# Patient Record
Sex: FEMALE | Race: WHITE | NOT HISPANIC OR LATINO | Employment: FULL TIME | URBAN - METROPOLITAN AREA
[De-identification: names, ages, dates, MRNs, and addresses within clinical notes are randomized per-mention and may not be internally consistent; named-entity substitution may affect disease eponyms.]

---

## 2019-12-26 PROBLEM — K21.9 GERD (GASTROESOPHAGEAL REFLUX DISEASE): Status: ACTIVE | Noted: 2019-12-26

## 2019-12-26 PROBLEM — G43.909 MIGRAINES: Status: ACTIVE | Noted: 2019-12-26

## 2019-12-26 PROBLEM — E78.5 HYPERLIPIDEMIA: Status: ACTIVE | Noted: 2019-12-26

## 2019-12-26 PROBLEM — I10 HYPERTENSION: Status: ACTIVE | Noted: 2019-12-26

## 2019-12-26 PROBLEM — K58.9 IBS (IRRITABLE BOWEL SYNDROME): Status: ACTIVE | Noted: 2019-12-26

## 2019-12-30 ENCOUNTER — OFFICE VISIT (OUTPATIENT)
Dept: FAMILY MEDICINE CLINIC | Facility: CLINIC | Age: 39
End: 2019-12-30

## 2019-12-30 VITALS
HEART RATE: 79 BPM | BODY MASS INDEX: 25.03 KG/M2 | TEMPERATURE: 97.7 F | WEIGHT: 136 LBS | OXYGEN SATURATION: 100 % | SYSTOLIC BLOOD PRESSURE: 129 MMHG | HEIGHT: 62 IN | DIASTOLIC BLOOD PRESSURE: 85 MMHG

## 2019-12-30 DIAGNOSIS — Z23 FLU VACCINE NEED: ICD-10-CM

## 2019-12-30 DIAGNOSIS — G43.009 MIGRAINE WITHOUT AURA AND WITHOUT STATUS MIGRAINOSUS, NOT INTRACTABLE: Primary | ICD-10-CM

## 2019-12-30 DIAGNOSIS — Z80.3 FAMILY HISTORY OF BREAST CANCER IN MOTHER: ICD-10-CM

## 2019-12-30 DIAGNOSIS — Z12.31 ENCOUNTER FOR SCREENING MAMMOGRAM FOR BREAST CANCER: ICD-10-CM

## 2019-12-30 PROBLEM — I10 HYPERTENSION: Status: RESOLVED | Noted: 2019-12-26 | Resolved: 2019-12-30

## 2019-12-30 PROCEDURE — 90471 IMMUNIZATION ADMIN: CPT | Performed by: FAMILY MEDICINE

## 2019-12-30 PROCEDURE — 99204 OFFICE O/P NEW MOD 45 MIN: CPT | Performed by: FAMILY MEDICINE

## 2019-12-30 PROCEDURE — 90674 CCIIV4 VAC NO PRSV 0.5 ML IM: CPT | Performed by: FAMILY MEDICINE

## 2019-12-30 RX ORDER — SUMATRIPTAN 100 MG/1
100 TABLET, FILM COATED ORAL DAILY
Qty: 9 TABLET | Refills: 12 | Status: SHIPPED | OUTPATIENT
Start: 2019-12-30 | End: 2021-01-13

## 2019-12-30 RX ORDER — SUMATRIPTAN 100 MG/1
1 TABLET, FILM COATED ORAL DAILY
COMMUNITY
Start: 2019-10-21 | End: 2019-12-30 | Stop reason: SDUPTHER

## 2020-01-21 DIAGNOSIS — Z80.3 FAMILY HISTORY OF BREAST CANCER IN MOTHER: ICD-10-CM

## 2021-01-13 DIAGNOSIS — G43.009 MIGRAINE WITHOUT AURA AND WITHOUT STATUS MIGRAINOSUS, NOT INTRACTABLE: ICD-10-CM

## 2021-01-13 RX ORDER — SUMATRIPTAN 100 MG/1
100 TABLET, FILM COATED ORAL DAILY
Qty: 9 TABLET | Refills: 0 | Status: SHIPPED | OUTPATIENT
Start: 2021-01-13 | End: 2021-02-17 | Stop reason: SDUPTHER

## 2021-01-13 NOTE — TELEPHONE ENCOUNTER
Please let patient know I am renewing Imitrex however has been over 1 year since last appointment, would recommend she makes an appointment for a recheck.

## 2021-02-17 ENCOUNTER — OFFICE VISIT (OUTPATIENT)
Dept: FAMILY MEDICINE CLINIC | Facility: CLINIC | Age: 41
End: 2021-02-17

## 2021-02-17 VITALS
HEIGHT: 63 IN | OXYGEN SATURATION: 99 % | SYSTOLIC BLOOD PRESSURE: 136 MMHG | RESPIRATION RATE: 16 BRPM | WEIGHT: 157.8 LBS | DIASTOLIC BLOOD PRESSURE: 90 MMHG | HEART RATE: 88 BPM | BODY MASS INDEX: 27.96 KG/M2 | TEMPERATURE: 97.1 F

## 2021-02-17 DIAGNOSIS — G89.29 CHRONIC MIDLINE THORACIC BACK PAIN: ICD-10-CM

## 2021-02-17 DIAGNOSIS — Z12.31 ENCOUNTER FOR SCREENING MAMMOGRAM FOR BREAST CANCER: ICD-10-CM

## 2021-02-17 DIAGNOSIS — Z13.1 SCREENING FOR DIABETES MELLITUS: ICD-10-CM

## 2021-02-17 DIAGNOSIS — G43.009 MIGRAINE WITHOUT AURA AND WITHOUT STATUS MIGRAINOSUS, NOT INTRACTABLE: ICD-10-CM

## 2021-02-17 DIAGNOSIS — Z11.59 NEED FOR HEPATITIS C SCREENING TEST: ICD-10-CM

## 2021-02-17 DIAGNOSIS — Z00.00 ANNUAL PHYSICAL EXAM: Primary | ICD-10-CM

## 2021-02-17 DIAGNOSIS — M54.50 LUMBAR BACK PAIN: ICD-10-CM

## 2021-02-17 DIAGNOSIS — Z13.220 SCREENING FOR CHOLESTEROL LEVEL: ICD-10-CM

## 2021-02-17 DIAGNOSIS — Z11.59 ENCOUNTER FOR HEPATITIS C SCREENING TEST FOR LOW RISK PATIENT: ICD-10-CM

## 2021-02-17 DIAGNOSIS — M54.6 CHRONIC MIDLINE THORACIC BACK PAIN: ICD-10-CM

## 2021-02-17 PROCEDURE — 99213 OFFICE O/P EST LOW 20 MIN: CPT | Performed by: FAMILY MEDICINE

## 2021-02-17 PROCEDURE — 99396 PREV VISIT EST AGE 40-64: CPT | Performed by: FAMILY MEDICINE

## 2021-02-17 RX ORDER — SUMATRIPTAN 100 MG/1
100 TABLET, FILM COATED ORAL DAILY
Qty: 9 TABLET | Refills: 12 | Status: SHIPPED | OUTPATIENT
Start: 2021-02-17 | End: 2022-03-07 | Stop reason: SDUPTHER

## 2021-02-17 NOTE — PROGRESS NOTES
"Chief Complaint  Annual Exam, Headache, and Back Pain    Subjective          History of Present Illness  Bren Rivas presents to [facility] for annual exam.  She has not been seen since December 2019. She is still having issues with her migraines. She takes the imitrex as prescribe. Patient is also having back pain in her lower back that radiates up the back to her shoulders. Started at least 8 months ago, seems to be getting worse. Stopped walking for exercise due to the pain, worried the severe pain could cause a fall. Icy hot, absorb, stretching has not made much difference.  Pain level average of 6.  Shooting pain up to a 10 however very brief.     Mammogram 1/20/20 at [facility] BI-RADS 1 Negative  Mother has history of breast cancer  Last Pap was October 2018, she gets every 3 years with gynecologist, Sharifa Almonte    No current outpatient medications on file prior to visit.     No current facility-administered medications on file prior to visit.        Objective   Vital Signs:   /90 (BP Location: Left arm, Patient Position: Sitting, Cuff Size: Adult)   Pulse 88   Temp 97.1 °F (36.2 °C) (Infrared)   Resp 16   Ht 160 cm (63\")   Wt 71.6 kg (157 lb 12.8 oz)   SpO2 99%   BMI 27.95 kg/m²     Physical Exam  Vitals signs and nursing note reviewed.   Constitutional:       General: She is not in acute distress.     Appearance: Normal appearance. She is well-developed.   HENT:      Head: Normocephalic and atraumatic.   Eyes:      Conjunctiva/sclera: Conjunctivae normal.      Pupils: Pupils are equal, round, and reactive to light.   Neck:      Musculoskeletal: Normal range of motion.      Thyroid: No thyromegaly.      Vascular: No JVD.   Cardiovascular:      Rate and Rhythm: Normal rate and regular rhythm.      Heart sounds: Normal heart sounds. No murmur.   Pulmonary:      Breath sounds: Normal breath sounds. No wheezing or rales.   Musculoskeletal: Normal range of motion.  "        General: No swelling, tenderness, deformity or signs of injury.      Right lower leg: No edema.      Left lower leg: No edema.      Comments: No significant tenderness palpation of thoracic or lumbosacral spine or paraspinous muscles.  There is normal range of motion of back and extremities.   Lymphadenopathy:      Cervical: No cervical adenopathy.   Skin:     General: Skin is warm and dry.      Findings: No rash.   Neurological:      Mental Status: She is alert and oriented to person, place, and time.   Psychiatric:         Mood and Affect: Mood normal.         Behavior: Behavior normal.          Lab Results   Component Value Date     (H) 02/17/2021    BUN 13 02/17/2021    CREATININE 0.56 (L) 02/17/2021    EGFRIFNONA 116 02/17/2021    EGFRIFAFRI 134 02/17/2021     02/17/2021    K 3.8 02/17/2021     02/17/2021    CALCIUM 10.3 (H) 02/17/2021    ALBUMIN 4.2 02/17/2021    BILITOT 0.3 02/17/2021    ALKPHOS 60 02/17/2021    AST 30 02/17/2021    ALT 39 (H) 02/17/2021    CHLPL 226 (H) 02/17/2021    TRIG 259 (H) 02/17/2021    HDL 41 02/17/2021    VLDL 47 (H) 02/17/2021     (H) 02/17/2021        No results found for: HGBA1C    Result Review :                       Assessment and Plan    Diagnoses and all orders for this visit:    1. Annual physical exam (Primary)  -     Mammo Screening Digital Tomosynthesis Bilateral With CAD; Future    2. Encounter for screening mammogram for breast cancer  -     Mammo Screening Digital Tomosynthesis Bilateral With CAD; Future    3. Need for hepatitis C screening test    4. Screening for diabetes mellitus  -     Comprehensive Metabolic Panel    5. Encounter for hepatitis C screening test for low risk patient  -     Hepatitis C Antibody    6. Screening for cholesterol level  -     Lipid Panel    7. Chronic midline thoracic back pain  -     XR Spine Thoracic 2 View    8. Lumbar back pain  -     XR Spine Lumbar 2 or 3 View    9. Migraine without aura and  without status migrainosus, not intractable  -     SUMAtriptan (IMITREX) 100 MG tablet; Take 1 tablet by mouth Daily. May repeat in 2 hours if need, max 2 in 24 hours  Dispense: 9 tablet; Refill: 12    Annual, pending health care, ordering mammogram.  She is to schedule Pap with gynecologist in October.  Screening for hepatitis C high cholesterol and diabetes.  Chronic thoracic and lumbar back pain x-rays as ordered  Migraine headaches stable, renewing Imitrex for as needed use.    Medications Discontinued During This Encounter   Medication Reason   • SUMAtriptan (IMITREX) 100 MG tablet Reorder         Follow Up     Return in about 1 year (around 2/17/2022) for Annual physical or as needed back pain, migraines, or other concerns..    Patient was given instructions and counseling regarding her condition or for health maintenance advice. Please see specific information pulled into the AVS if appropriate.

## 2021-02-18 DIAGNOSIS — M54.6 CHRONIC MIDLINE THORACIC BACK PAIN: Primary | ICD-10-CM

## 2021-02-18 DIAGNOSIS — M54.50 LUMBAR BACK PAIN: ICD-10-CM

## 2021-02-18 DIAGNOSIS — G89.29 CHRONIC MIDLINE THORACIC BACK PAIN: Primary | ICD-10-CM

## 2021-02-18 LAB
ALBUMIN SERPL-MCNC: 4.2 G/DL (ref 3.8–4.8)
ALBUMIN/GLOB SERPL: 1.6 {RATIO} (ref 1.2–2.2)
ALP SERPL-CCNC: 60 IU/L (ref 39–117)
ALT SERPL-CCNC: 39 IU/L (ref 0–32)
AST SERPL-CCNC: 30 IU/L (ref 0–40)
BILIRUB SERPL-MCNC: 0.3 MG/DL (ref 0–1.2)
BUN SERPL-MCNC: 13 MG/DL (ref 6–24)
BUN/CREAT SERPL: 23 (ref 9–23)
CALCIUM SERPL-MCNC: 10.3 MG/DL (ref 8.7–10.2)
CHLORIDE SERPL-SCNC: 100 MMOL/L (ref 96–106)
CHOLEST SERPL-MCNC: 226 MG/DL (ref 100–199)
CO2 SERPL-SCNC: 24 MMOL/L (ref 20–29)
CREAT SERPL-MCNC: 0.56 MG/DL (ref 0.57–1)
GLOBULIN SER CALC-MCNC: 2.6 G/DL (ref 1.5–4.5)
GLUCOSE SERPL-MCNC: 104 MG/DL (ref 65–99)
HCV AB S/CO SERPL IA: <0.1 S/CO RATIO (ref 0–0.9)
HDLC SERPL-MCNC: 41 MG/DL
LDLC SERPL CALC-MCNC: 138 MG/DL (ref 0–99)
POTASSIUM SERPL-SCNC: 3.8 MMOL/L (ref 3.5–5.2)
PROT SERPL-MCNC: 6.8 G/DL (ref 6–8.5)
SODIUM SERPL-SCNC: 140 MMOL/L (ref 134–144)
TRIGL SERPL-MCNC: 259 MG/DL (ref 0–149)
VLDLC SERPL CALC-MCNC: 47 MG/DL (ref 5–40)

## 2021-02-18 RX ORDER — MELOXICAM 15 MG/1
15 TABLET ORAL DAILY
Qty: 30 TABLET | Refills: 2 | Status: SHIPPED | OUTPATIENT
Start: 2021-02-18 | End: 2021-05-24

## 2021-03-05 DIAGNOSIS — Z00.00 ANNUAL PHYSICAL EXAM: ICD-10-CM

## 2021-03-05 DIAGNOSIS — Z12.31 ENCOUNTER FOR SCREENING MAMMOGRAM FOR BREAST CANCER: ICD-10-CM

## 2021-04-28 ENCOUNTER — TELEPHONE (OUTPATIENT)
Dept: FAMILY MEDICINE CLINIC | Facility: CLINIC | Age: 41
End: 2021-04-28

## 2021-04-28 DIAGNOSIS — T75.3XXA MOTION SICKNESS, INITIAL ENCOUNTER: Primary | ICD-10-CM

## 2021-04-28 RX ORDER — SCOLOPAMINE TRANSDERMAL SYSTEM 1 MG/1
1 PATCH, EXTENDED RELEASE TRANSDERMAL
Qty: 4 PATCH | Refills: 1 | Status: SHIPPED | OUTPATIENT
Start: 2021-04-28

## 2021-04-28 NOTE — TELEPHONE ENCOUNTER
Caller: Bren Rivas    Relationship: Self    Best call back number: [phone]    What medication are you requesting: MOTION SICKNESS PATCHES         If a prescription is needed, what is your preferred pharmacy and phone number: [id number] - [place], [place] - [address] . - [phone] [facility] [phone] FX     Additional notes: PATIENT STATES THAT SHE AND HER SON BOTH GET VERY CAR SICK. THEY ARE GETTING READY TO LEAVE ON VACATION AND REQUEST MOTION SICKNESS PATCHES.

## 2021-05-22 DIAGNOSIS — M54.6 CHRONIC MIDLINE THORACIC BACK PAIN: ICD-10-CM

## 2021-05-22 DIAGNOSIS — M54.50 LUMBAR BACK PAIN: ICD-10-CM

## 2021-05-22 DIAGNOSIS — G89.29 CHRONIC MIDLINE THORACIC BACK PAIN: ICD-10-CM

## 2021-05-24 RX ORDER — MELOXICAM 15 MG/1
TABLET ORAL
Qty: 30 TABLET | Refills: 5 | Status: SHIPPED | OUTPATIENT
Start: 2021-05-24 | End: 2021-11-22

## 2021-11-20 DIAGNOSIS — M54.6 CHRONIC MIDLINE THORACIC BACK PAIN: ICD-10-CM

## 2021-11-20 DIAGNOSIS — M54.50 LUMBAR BACK PAIN: ICD-10-CM

## 2021-11-20 DIAGNOSIS — G89.29 CHRONIC MIDLINE THORACIC BACK PAIN: ICD-10-CM

## 2021-11-22 RX ORDER — MELOXICAM 15 MG/1
15 TABLET ORAL DAILY
Qty: 30 TABLET | Refills: 2 | Status: SHIPPED | OUTPATIENT
Start: 2021-11-22 | End: 2022-03-07 | Stop reason: SDUPTHER

## 2022-03-03 ENCOUNTER — TELEPHONE (OUTPATIENT)
Dept: FAMILY MEDICINE CLINIC | Facility: CLINIC | Age: 42
End: 2022-03-03

## 2022-03-03 DIAGNOSIS — Z12.31 ENCOUNTER FOR SCREENING MAMMOGRAM FOR BREAST CANCER: Primary | ICD-10-CM

## 2022-03-03 NOTE — TELEPHONE ENCOUNTER
Caller: Bren Rivas    Relationship: Self    Best call back number: [phone]    What orders are you requesting (i.e. lab or imaging): MAMMOGRAM     In what timeframe would the patient need to come in:     Where will you receive your lab/imaging services:     Additional notes: [facility] MOJ[facility] PHONE [phone]  EXT 1

## 2022-03-04 NOTE — TELEPHONE ENCOUNTER
Please place order for screening mammogram as requested to be done at [facility] women's [facility]

## 2022-03-05 DIAGNOSIS — M54.6 CHRONIC MIDLINE THORACIC BACK PAIN: ICD-10-CM

## 2022-03-05 DIAGNOSIS — G89.29 CHRONIC MIDLINE THORACIC BACK PAIN: ICD-10-CM

## 2022-03-05 DIAGNOSIS — M54.50 LUMBAR BACK PAIN: ICD-10-CM

## 2022-03-07 ENCOUNTER — OFFICE VISIT (OUTPATIENT)
Dept: FAMILY MEDICINE CLINIC | Facility: CLINIC | Age: 42
End: 2022-03-07

## 2022-03-07 VITALS
OXYGEN SATURATION: 98 % | TEMPERATURE: 98 F | DIASTOLIC BLOOD PRESSURE: 68 MMHG | SYSTOLIC BLOOD PRESSURE: 127 MMHG | RESPIRATION RATE: 18 BRPM | WEIGHT: 154 LBS | BODY MASS INDEX: 27.29 KG/M2 | HEART RATE: 86 BPM | HEIGHT: 63 IN

## 2022-03-07 DIAGNOSIS — M54.6 CHRONIC MIDLINE THORACIC BACK PAIN: ICD-10-CM

## 2022-03-07 DIAGNOSIS — G89.29 CHRONIC MIDLINE THORACIC BACK PAIN: ICD-10-CM

## 2022-03-07 DIAGNOSIS — M54.50 LUMBAR BACK PAIN: ICD-10-CM

## 2022-03-07 DIAGNOSIS — G43.009 MIGRAINE WITHOUT AURA AND WITHOUT STATUS MIGRAINOSUS, NOT INTRACTABLE: ICD-10-CM

## 2022-03-07 PROCEDURE — 99214 OFFICE O/P EST MOD 30 MIN: CPT | Performed by: FAMILY MEDICINE

## 2022-03-07 RX ORDER — SENNOSIDES 8.6 MG
CAPSULE ORAL
Qty: 90 TABLET | Refills: 3
Start: 2022-03-07

## 2022-03-07 RX ORDER — TOPIRAMATE 25 MG/1
TABLET ORAL
Qty: 360 TABLET | Refills: 0 | Status: SHIPPED | OUTPATIENT
Start: 2022-03-07 | End: 2022-04-12 | Stop reason: SINTOL

## 2022-03-07 RX ORDER — MELOXICAM 15 MG/1
15 TABLET ORAL DAILY
Qty: 90 TABLET | Refills: 3 | Status: SHIPPED | OUTPATIENT
Start: 2022-03-07

## 2022-03-07 RX ORDER — SUMATRIPTAN 100 MG/1
100 TABLET, FILM COATED ORAL DAILY
Qty: 27 TABLET | Refills: 3 | Status: SHIPPED | OUTPATIENT
Start: 2022-03-07 | End: 2022-11-28

## 2022-03-07 RX ORDER — MELOXICAM 15 MG/1
15 TABLET ORAL DAILY
Qty: 30 TABLET | Refills: 12 | OUTPATIENT
Start: 2022-03-07

## 2022-03-07 NOTE — PROGRESS NOTES
"Chief Complaint  Headache, Back Pain, Shoulder Pain, and Neck Pain     History of Present Illness  Bren Rivas presents today for a follow up on chronic medical concerns.  Last appointment was just over a year ago.     Migraines: about 2 a month but last for 3 days. Have dose typically taking 4 or 5 imitrex over the 3 days.  The patient states that she is still taking the SUMAtriptan (IMITREX) 100 MG tablet. The patient states that she will take one at the onset of a headache frequent do take a second and that does help relieve the pain.    Chronic back, neck and shoulder pain.  She is taking tylenol arthrtis and  meloxicam (MOBIC) 15 MG tablet daily and reports that she feels like she has a stiff neck and shoulders and does have a hard time sleeping at night due to the pain, but the medication does help some.     Believes had Pap with gynecologist, Sharifa Almonte October 2021    Current Outpatient Medications on File Prior to Visit   Medication Sig   • Scopolamine (Transderm-Scop, 1.5 MG,) 1.5 MG/3DAYS patch Place 1 patch on the skin as directed by provider Every 72 (Seventy-Two) Hours. Starting at least 4 hours prior to travel   • [DISCONTINUED] meloxicam (MOBIC) 15 MG tablet Take 1 tablet by mouth Daily.   • [DISCONTINUED] SUMAtriptan (IMITREX) 100 MG tablet Take 1 tablet by mouth Daily. May repeat in 2 hours if need, max 2 in 24 hours     No current facility-administered medications on file prior to visit.       Objective   Vital Signs:   /68   Pulse 86   Temp 98 °F (36.7 °C) (Temporal)   Resp 18   Ht 160 cm (63\")   Wt 69.9 kg (154 lb)   SpO2 98%   BMI 27.28 kg/m²       Physical Exam  Vitals and nursing note reviewed.   Constitutional:       General: She is not in acute distress.     Appearance: Normal appearance. She is well-developed. She is obese.   HENT:      Head: Normocephalic and atraumatic.   Eyes:      Conjunctiva/sclera: Conjunctivae normal.      Pupils: Pupils are equal, round, and " reactive to light.   Neck:      Thyroid: No thyromegaly.      Vascular: No JVD.   Cardiovascular:      Rate and Rhythm: Normal rate and regular rhythm.      Heart sounds: Normal heart sounds. No murmur heard.  Pulmonary:      Breath sounds: Normal breath sounds. No wheezing or rales.   Musculoskeletal:         General: No swelling, tenderness, deformity or signs of injury. Normal range of motion.      Cervical back: Normal range of motion.      Right lower leg: No edema.      Left lower leg: No edema.      Comments: No significant tenderness palpation of cervical, thoracic or lumbosacral spine or paraspinous muscles.  There is normal range of motion of neck, back, shoulders and arms   Lymphadenopathy:      Cervical: No cervical adenopathy.   Skin:     General: Skin is warm and dry.      Findings: No rash.   Neurological:      Mental Status: She is alert and oriented to person, place, and time.   Psychiatric:         Mood and Affect: Mood normal.         Behavior: Behavior normal.            No visits with results within 1 Day(s) from this visit.   Latest known visit with results is:   Office Visit on 02/17/2021   Component Date Value Ref Range Status   • Hep C Virus Ab 02/17/2021 <0.1  0.0 - 0.9 s/co ratio Final    Comment:                                   Negative:     < 0.8                               Indeterminate: 0.8 - 0.9                                    Positive:     > 0.9   The CDC recommends that a positive HCV antibody result   be followed up with a HCV Nucleic Acid Amplification   test (116624).     • Total Cholesterol 02/17/2021 226 (A) 100 - 199 mg/dL Final   • Triglycerides 02/17/2021 259 (A) 0 - 149 mg/dL Final   • HDL Cholesterol 02/17/2021 41  >39 mg/dL Final   • VLDL Cholesterol [place] 02/17/2021 47 (A) 5 - 40 mg/dL Final   • LDL Chol Calc ([facility]) 02/17/2021 138 (A) 0 - 99 mg/dL Final   • Glucose 02/17/2021 104 (A) 65 - 99 mg/dL Final   • BUN 02/17/2021 13  6 - 24 mg/dL Final   • Creatinine  02/17/2021 0.56 (A) 0.57 - 1.00 mg/dL Final   • eGFR Non  Am 02/17/2021 116  >59 mL/min/1.73 Final   • eGFR African Am 02/17/2021 134  >59 mL/min/1.73 Final   • BUN/Creatinine Ratio 02/17/2021 23  9 - 23 Final   • Sodium 02/17/2021 140  134 - 144 mmol/L Final   • Potassium 02/17/2021 3.8  3.5 - 5.2 mmol/L Final   • Chloride 02/17/2021 100  96 - 106 mmol/L Final   • Total CO2 02/17/2021 24  20 - 29 mmol/L Final   • Calcium 02/17/2021 10.3 (A) 8.7 - 10.2 mg/dL Final   • Total Protein 02/17/2021 6.8  6.0 - 8.5 g/dL Final   • Albumin 02/17/2021 4.2  3.8 - 4.8 g/dL Final   • Globulin 02/17/2021 2.6  1.5 - 4.5 g/dL Final   • A/G Ratio 02/17/2021 1.6  1.2 - 2.2 Final   • Total Bilirubin 02/17/2021 0.3  0.0 - 1.2 mg/dL Final   • Alkaline Phosphatase 02/17/2021 60  39 - 117 IU/L Final   • AST (SGOT) 02/17/2021 30  0 - 40 IU/L Final   • ALT (SGPT) 02/17/2021 39 (A) 0 - 32 IU/L Final             No results found for: HGBA1C        DATE OF EXAM:  2/17/2021 3:08 PM     PROCEDURE:  XR SPINE LUMBAR 2 OR 3 VW-     INDICATIONS:  Low back pain.     COMPARISON:  No Comparisons Available     TECHNIQUE:   Two to three radiologic views of the lumbosacral spine were obtained.     FINDINGS:  There is spurring of the vertebral endplates indicating spondylosis. The  disc spaces are well-maintained. There is an incidental posterior spinal  fusion defect at L5. The facet joints are intact. There is mild spurring  of the sacroiliac joints indicating arthritic changes. There is no acute  fracture or dislocation. The soft tissues are unremarkable.     IMPRESSION:  No acute fracture or dislocation.     Electronically Signed By-Jordon Gaston MD On:2/17/2021 3:25 PM  This report was finalized on [id number] by  Jordon Gaston MD.     Assessment and Plan {CC Problem List  Visit Diagnosis  ROS  Review (Popup)  Health Maintenance  Quality  BestPractice  Medications  SmartSets  SnapShot Encounters  Media :23}   Diagnoses and all orders  for this visit:    1. Migraine without aura and without status migrainosus, not intractable  -     SUMAtriptan (IMITREX) 100 MG tablet; Take 1 tablet by mouth Daily. May repeat in 2 hours if need, max 2 in 24 hours  Dispense: 27 tablet; Refill: 3  -     topiramate (Topamax) 25 MG tablet; 1 bid for 1 week increase to 2 bid if needed for migraine prevention  Dispense: 360 tablet; Refill: 0    2. Chronic midline thoracic back pain  -     meloxicam (MOBIC) 15 MG tablet; Take 1 tablet by mouth Daily.  Dispense: 90 tablet; Refill: 3  -     acetaminophen (TYLENOL) 650 MG 8 hr tablet; 1 q am for muscle back pain  Dispense: 90 tablet; Refill: 3    3. Lumbar back pain  -     meloxicam (MOBIC) 15 MG tablet; Take 1 tablet by mouth Daily.  Dispense: 90 tablet; Refill: 3  -     acetaminophen (TYLENOL) 650 MG 8 hr tablet; 1 q am for muscle back pain  Dispense: 90 tablet; Refill: 3      Migraine headaches, with at least 6 headache days a month.  She is using her entire monthly supply of Imitrex and sometimes this is not enough.  After discussion we are going to try Topamax as a preventative.  Starting at 25 mg twice daily increasing to 50 mg twice daily and could increase further if needed.  Advised there are other preventative treatments if this is not effective.    x-rays of lumbar and thoracic spine did show spurring of the vertebral  endplates indicating spondylosis and spurring at the SI joints indicating arthritic changes.  Reminded to do back stretching and strengthening exercises.  Patient declines referral to physical therapy, saying it did not help in the past.  She will continue meloxicam and Tylenol arthritis in the mornings, advised she can increase the Tylenol to 2 tablets 3 times daily if needed.  Could consider muscle relaxer but do not want to start it and Topamax at the same time.  Will consider when she returns.     Obesity, discussed weight reduction by reduced calorie diet and increase physical activity.   Additionally hopefully Topamax will not work as an appetite suppressant.    Medications Discontinued During This Encounter   Medication Reason   • SUMAtriptan (IMITREX) 100 MG tablet Reorder   • meloxicam (MOBIC) 15 MG tablet Reorder         Follow Up     Return in about 3 months (around 6/7/2022) for Annual physical recheck migraines and muscle stiffness.    Patient was given instructions and counseling regarding her condition or for health maintenance advice. Please see specific information pulled into the AVS if appropriate.

## 2022-04-12 ENCOUNTER — OFFICE VISIT (OUTPATIENT)
Dept: FAMILY MEDICINE CLINIC | Facility: CLINIC | Age: 42
End: 2022-04-12

## 2022-04-12 VITALS
BODY MASS INDEX: 27.32 KG/M2 | SYSTOLIC BLOOD PRESSURE: 126 MMHG | OXYGEN SATURATION: 100 % | TEMPERATURE: 98.2 F | HEIGHT: 63 IN | RESPIRATION RATE: 12 BRPM | HEART RATE: 104 BPM | DIASTOLIC BLOOD PRESSURE: 90 MMHG | WEIGHT: 154.2 LBS

## 2022-04-12 DIAGNOSIS — K21.9 GASTROESOPHAGEAL REFLUX DISEASE, UNSPECIFIED WHETHER ESOPHAGITIS PRESENT: ICD-10-CM

## 2022-04-12 DIAGNOSIS — M54.50 LUMBAR BACK PAIN: ICD-10-CM

## 2022-04-12 DIAGNOSIS — Z00.00 ANNUAL PHYSICAL EXAM: Primary | ICD-10-CM

## 2022-04-12 DIAGNOSIS — E66.3 OVERWEIGHT: ICD-10-CM

## 2022-04-12 DIAGNOSIS — G89.29 CHRONIC MIDLINE THORACIC BACK PAIN: ICD-10-CM

## 2022-04-12 DIAGNOSIS — M54.6 CHRONIC MIDLINE THORACIC BACK PAIN: ICD-10-CM

## 2022-04-12 DIAGNOSIS — G89.29 CHRONIC LEFT SHOULDER PAIN: ICD-10-CM

## 2022-04-12 DIAGNOSIS — M25.512 CHRONIC LEFT SHOULDER PAIN: ICD-10-CM

## 2022-04-12 DIAGNOSIS — G43.009 MIGRAINE WITHOUT AURA AND WITHOUT STATUS MIGRAINOSUS, NOT INTRACTABLE: ICD-10-CM

## 2022-04-12 PROCEDURE — 99396 PREV VISIT EST AGE 40-64: CPT | Performed by: FAMILY MEDICINE

## 2022-04-12 PROCEDURE — 99214 OFFICE O/P EST MOD 30 MIN: CPT | Performed by: FAMILY MEDICINE

## 2022-04-12 RX ORDER — CYCLOBENZAPRINE HCL 10 MG
10 TABLET ORAL 3 TIMES DAILY PRN
Qty: 30 TABLET | Refills: 1 | Status: SHIPPED | OUTPATIENT
Start: 2022-04-12

## 2022-04-12 NOTE — PROGRESS NOTES
Mikaela Rivas is a 41 y.o. female.     Chief Complaint   Patient presents with   • Annual Exam   • Medication Reaction     History of Present Illness   Pt reports her new medication, topamax, increased to 25 mg bid,  has been giving her some side effects with heartburn worsening on a daily basis, having needle numb like feeling in her fingertips and toes. Did not notice improvement in migraines still with about 6 a month. Imitrex does help.does cause some constipation but tollerable.    Meloxicam does help neck pain.   Heart burn is getting worse, tums not helping as much as used to, symptoms now daily.  Pt states when she lifts 20-30 lbs she will experience sharp, shooting like pains in her chest. Need meloxicam for low back pain  Is also scheduled to follow-up migraines.  Last appointment 5 weeks ago we started her on Topamax with titration up to max of 50 mg twice daily      The patient is here: for coordination of medical care to discuss health maintenance and disease prevention.  Last comprehensive physical was on 02/17/2021.  Previous physical was performed by  Dee Segovia MD  Overall has: moderate activity with work/home activities, exercises 2 - 3 times per week, good appetite, feels well with minor complaints, decreased energy level and is sleeping poorly. Nutrition: balanced diet and excessive weight gain. Last tetanus shot was unknown.   Patient is doing routine self breast exams: every 4 - 6 months   Last Completed Mammogram     This patient has no relevant Health Maintenance data.      March 4 2022 [facility] 1        Recent Hospitalizations:  No hospitalization(s) within the last year..  ccc      I personally reviewed and updated the patient's allergies, medications, problem list, and past medical, surgical, social, and family history. I have reviewed and confirmed the accuracy of the HPI and ROS as documented by the MA/LPN/RN Dee Segovia,  "MD    Family History   Problem Relation Age of Onset   • Cancer Mother         breast   • Diabetes Father    • Hypertension Father    • Heart disease Father    • Parkinsonism Maternal Grandmother    • Parkinsonism Paternal Grandmother    • Diabetes Paternal Grandfather    • Cancer Son         breast        Social History     Tobacco Use   • Smoking status: Never Smoker   • Smokeless tobacco: Never Used   Vaping Use   • Vaping Use: Never used   Substance Use Topics   • Alcohol use: Not Currently   • Drug use: Never       Past Surgical History:   Procedure Laterality Date   • DENTAL PROCEDURE      implant   • TUBAL ABDOMINAL LIGATION         Patient Active Problem List   Diagnosis   • Hyperlipidemia   • Migraines   • GERD (gastroesophageal reflux disease)   • IBS (irritable bowel syndrome)   • Family history of breast cancer in mother   • Overweight   • Chronic left shoulder pain   • Lumbar back pain   • Chronic midline thoracic back pain         Current Outpatient Medications:   •  acetaminophen (TYLENOL) 650 MG 8 hr tablet, 1 q am for muscle back pain, Disp: 90 tablet, Rfl: 3  •  meloxicam (MOBIC) 15 MG tablet, Take 1 tablet by mouth Daily., Disp: 90 tablet, Rfl: 3  •  SUMAtriptan (IMITREX) 100 MG tablet, Take 1 tablet by mouth Daily. May repeat in 2 hours if need, max 2 in 24 hours, Disp: 27 tablet, Rfl: 3  •  cyclobenzaprine (FLEXERIL) 10 MG tablet, Take 1 tablet by mouth 3 (Three) Times a Day As Needed for Muscle Spasms., Disp: 30 tablet, Rfl: 1  •  Scopolamine (Transderm-Scop, 1.5 MG,) 1.5 MG/3DAYS patch, Place 1 patch on the skin as directed by provider Every 72 (Seventy-Two) Hours. Starting at least 4 hours prior to travel, Disp: 4 patch, Rfl: 1        Objective   /90   Pulse 104   Temp 98.2 °F (36.8 °C)   Resp 12   Ht 160 cm (62.99\")   Wt 69.9 kg (154 lb 3.2 oz)   SpO2 100%   BMI 27.32 kg/m²   BP Readings from Last 3 Encounters:   04/12/22 126/90   03/07/22 127/68   02/17/21 136/90     Wt " Readings from Last 3 Encounters:   04/12/22 69.9 kg (154 lb 3.2 oz)   03/07/22 69.9 kg (154 lb)   02/17/21 71.6 kg (157 lb 12.8 oz)     Physical Exam  Vitals and nursing note reviewed.   Constitutional:       General: She is not in acute distress.     Appearance: Normal appearance. She is well-developed. She is obese.   HENT:      Head: Normocephalic and atraumatic.   Eyes:      Conjunctiva/sclera: Conjunctivae normal.      Pupils: Pupils are equal, round, and reactive to light.   Neck:      Thyroid: No thyromegaly.      Vascular: No JVD.   Cardiovascular:      Rate and Rhythm: Normal rate and regular rhythm.      Heart sounds: Normal heart sounds. No murmur heard.  Pulmonary:      Breath sounds: Normal breath sounds. No wheezing or rales.   Musculoskeletal:         General: No swelling, tenderness, deformity or signs of injury. Normal range of motion.      Cervical back: Normal range of motion.      Right lower leg: No edema.      Left lower leg: No edema.      Comments: Left posterior shoulder muscles are very tight however there is no significant tenderness palpation of cervical, thoracic or lumbosacral spine or paraspinous muscles.  There is normal range of motion of neck, back, shoulders and arms   Lymphadenopathy:      Cervical: No cervical adenopathy.   Skin:     General: Skin is warm and dry.      Findings: No rash.   Neurological:      Mental Status: She is alert and oriented to person, place, and time.   Psychiatric:         Mood and Affect: Mood normal.         Behavior: Behavior normal.         Data / Lab Results:    No results found for: HGBA1C     Lab Results   Component Value Date     (H) 02/17/2021     No results found for: CHOL  Lab Results   Component Value Date    TRIG 259 (H) 02/17/2021     Lab Results   Component Value Date    HDL 41 02/17/2021     No results found for: PSA  No results found for: WBC, HGB, HCT, MCV, PLT  No results found for: TSH, T9LRYRB, D5PWZRG   Lab Results    Component Value Date    GLUCOSE 104 (H) 02/17/2021    BUN 13 02/17/2021    CREATININE 0.56 (L) 02/17/2021    EGFRIFNONA 116 02/17/2021    EGFRIFAFRI 134 02/17/2021    BCR 23 02/17/2021    K 3.8 02/17/2021    CO2 24 02/17/2021    CALCIUM 10.3 (H) 02/17/2021    PROTENTOTREF 6.8 02/17/2021    ALBUMIN 4.2 02/17/2021    LABIL2 1.6 02/17/2021    AST 30 02/17/2021    ALT 39 (H) 02/17/2021       The 10-year ASCVD risk score (Tessy BOYCE Jr., et al., 2013) is: 1.4%    Values used to calculate the score:      Age: 41 years      Sex: Female      Is Non- : No      Diabetic: No      Tobacco smoker: No      Systolic Blood Pressure: 126 mmHg      Is BP treated: No      HDL Cholesterol: 41 mg/dL      Total Cholesterol: 226 mg/dL      Age-appropriate Screening Schedule:  Refer to the list below for future screening recommendations based on patient's age, sex and/or medical conditions. Orders for these recommended tests are listed in the plan section. The patient has been provided with a written plan.    Health Maintenance   Topic Date Due   • TDAP/TD VACCINES (1 - Tdap) Never done   • INFLUENZA VACCINE  08/01/2022   • LIPID PANEL  02/17/2023   • PAP SMEAR  03/07/2025           Assessment/Plan      Medications        Problem List         LOS        Diagnoses and all orders for this visit:    1. Annual physical exam (Primary)    2. Migraine without aura and without status migrainosus, not intractable    3. Chronic midline thoracic back pain  -     cyclobenzaprine (FLEXERIL) 10 MG tablet; Take 1 tablet by mouth 3 (Three) Times a Day As Needed for Muscle Spasms.  Dispense: 30 tablet; Refill: 1    4. Lumbar back pain  -     cyclobenzaprine (FLEXERIL) 10 MG tablet; Take 1 tablet by mouth 3 (Three) Times a Day As Needed for Muscle Spasms.  Dispense: 30 tablet; Refill: 1    5. Chronic left shoulder pain  -     cyclobenzaprine (FLEXERIL) 10 MG tablet; Take 1 tablet by mouth 3 (Three) Times a Day As Needed for Muscle  Spasms.  Dispense: 30 tablet; Refill: 1    6. Gastroesophageal reflux disease, unspecified whether esophagitis present    7. Overweight    The patient was counseled regarding nutrition, physical activity, healthy weight, injury prevention, immunizations and preventative health screenings.  Discussed lab work, patient declines cholesterol screening, last year was slightly elevated but atherosclerotic cardiovascular disease risk at her age is still very low.  Do recommend low cholesterol diet and weight reduction.    Will schedule pap with Sharifa quiñonez     Increasing reflux symptoms, patient wants to going Topamax however more likely triggered by meloxicam being overweight.  Do recommend weight reduction and may try Pepcid as needed.  Migraine headaches, not improved with Topamax and having significant side effects.  Stopping Topamax and continue Imitrex which does work well for abortive therapy.  She declines other preventative medications at this time    Left posterior shoulder pain, chronic muscle spasm.  We will try Flexeril 10 mg 1 or 2 tablets at night, can try half dose during the day, if too sedating but muscle relaxer is helpful we can see if insurance would cover Skelaxin    Expected course, medications, and adverse effects discussed.  Call or return if worsening or persistent symptoms.  I wore protective equipment throughout this patient encounter including a mask, gloves, and eye protection.  Hand hygiene was performed before donning protective equipment and after removal when leaving the room. The complete contents of the Assessment and Plan and Data / Lab Results as documented above have been reviewed and addressed by myself with the patient today as part of an ongoing evaluation / treatment plan.  If some of the documentation has been copied from a previous note and is unchanged it indicates that this problem / plan has been assessed today but is stable from a previous visit and no changes have been  recommended.

## 2022-06-04 DIAGNOSIS — G43.009 MIGRAINE WITHOUT AURA AND WITHOUT STATUS MIGRAINOSUS, NOT INTRACTABLE: ICD-10-CM

## 2022-06-06 RX ORDER — TOPIRAMATE 25 MG/1
TABLET ORAL
Qty: 360 TABLET | Refills: 0 | OUTPATIENT
Start: 2022-06-06

## 2022-07-08 DIAGNOSIS — G43.009 MIGRAINE WITHOUT AURA AND WITHOUT STATUS MIGRAINOSUS, NOT INTRACTABLE: ICD-10-CM

## 2022-07-08 RX ORDER — TOPIRAMATE 25 MG/1
TABLET ORAL
Qty: 360 TABLET | Refills: 0 | OUTPATIENT
Start: 2022-07-08

## 2022-11-28 DIAGNOSIS — G43.009 MIGRAINE WITHOUT AURA AND WITHOUT STATUS MIGRAINOSUS, NOT INTRACTABLE: ICD-10-CM

## 2022-11-28 RX ORDER — SUMATRIPTAN 100 MG/1
100 TABLET, FILM COATED ORAL DAILY
Qty: 18 TABLET | Refills: 2 | Status: SHIPPED | OUTPATIENT
Start: 2022-11-28

## 2023-05-22 ENCOUNTER — OFFICE VISIT (OUTPATIENT)
Dept: FAMILY MEDICINE CLINIC | Facility: CLINIC | Age: 43
End: 2023-05-22
Payer: COMMERCIAL

## 2023-05-22 VITALS
DIASTOLIC BLOOD PRESSURE: 64 MMHG | SYSTOLIC BLOOD PRESSURE: 122 MMHG | RESPIRATION RATE: 18 BRPM | TEMPERATURE: 98.6 F | HEIGHT: 63 IN | OXYGEN SATURATION: 98 % | WEIGHT: 145 LBS | HEART RATE: 94 BPM | BODY MASS INDEX: 25.69 KG/M2

## 2023-05-22 DIAGNOSIS — G89.29 CHRONIC LEFT SHOULDER PAIN: ICD-10-CM

## 2023-05-22 DIAGNOSIS — E66.3 OVERWEIGHT (BMI 25.0-29.9): ICD-10-CM

## 2023-05-22 DIAGNOSIS — G43.009 MIGRAINE WITHOUT AURA AND WITHOUT STATUS MIGRAINOSUS, NOT INTRACTABLE: ICD-10-CM

## 2023-05-22 DIAGNOSIS — Z13.1 SCREENING FOR DIABETES MELLITUS: ICD-10-CM

## 2023-05-22 DIAGNOSIS — Z86.2 HISTORY OF ANEMIA: ICD-10-CM

## 2023-05-22 DIAGNOSIS — M25.512 CHRONIC PAIN OF BOTH SHOULDERS: ICD-10-CM

## 2023-05-22 DIAGNOSIS — G89.29 CHRONIC MIDLINE THORACIC BACK PAIN: ICD-10-CM

## 2023-05-22 DIAGNOSIS — M25.512 CHRONIC LEFT SHOULDER PAIN: ICD-10-CM

## 2023-05-22 DIAGNOSIS — G89.29 CHRONIC PAIN OF BOTH SHOULDERS: ICD-10-CM

## 2023-05-22 DIAGNOSIS — M25.511 CHRONIC PAIN OF BOTH SHOULDERS: ICD-10-CM

## 2023-05-22 DIAGNOSIS — Z12.4 CERVICAL CANCER SCREENING: ICD-10-CM

## 2023-05-22 DIAGNOSIS — E78.2 MIXED HYPERLIPIDEMIA: ICD-10-CM

## 2023-05-22 DIAGNOSIS — Z00.00 ANNUAL PHYSICAL EXAM: Primary | ICD-10-CM

## 2023-05-22 DIAGNOSIS — K21.9 GASTROESOPHAGEAL REFLUX DISEASE, UNSPECIFIED WHETHER ESOPHAGITIS PRESENT: ICD-10-CM

## 2023-05-22 DIAGNOSIS — M47.816 SPONDYLOSIS OF LUMBAR SPINE: ICD-10-CM

## 2023-05-22 DIAGNOSIS — M54.50 LUMBAR BACK PAIN: ICD-10-CM

## 2023-05-22 DIAGNOSIS — M54.6 CHRONIC MIDLINE THORACIC BACK PAIN: ICD-10-CM

## 2023-05-22 RX ORDER — FAMOTIDINE 40 MG/1
40 TABLET, FILM COATED ORAL NIGHTLY
Qty: 30 TABLET | Refills: 2 | Status: SHIPPED | OUTPATIENT
Start: 2023-05-22

## 2023-05-22 RX ORDER — SUMATRIPTAN 100 MG/1
100 TABLET, FILM COATED ORAL DAILY
Qty: 18 TABLET | Refills: 2 | Status: SHIPPED | OUTPATIENT
Start: 2023-05-22

## 2023-05-22 RX ORDER — CELECOXIB 200 MG/1
200 CAPSULE ORAL DAILY
Qty: 30 CAPSULE | Refills: 3 | Status: SHIPPED | OUTPATIENT
Start: 2023-05-22

## 2023-05-22 NOTE — ASSESSMENT & PLAN NOTE
Patient's (Body mass index is 25.69 kg/m².) indicates that they are overweight with health conditions that include dyslipidemias and GERD . Weight is unchanged. BMI is is above average; BMI management plan is completed. We discussed portion control and increasing exercise.

## 2023-05-22 NOTE — PROGRESS NOTES
"  Chief Complaint   Patient presents with   • Annual Exam       History of Present Illness  Mikaela Rivas is a 42 y.o. female.       The patient is here: for coordination of medical care to discuss health maintenance and disease prevention to follow up on multiple medical problems.  Last comprehensive physical was on 04/12/2022.  Previous physical was performed by  Dee Segovia MD  Overall has: moderate activity with work/home activities, exercises 2 - 3 times per week, good appetite, feels well with minor complaints, good energy level and is sleeping poorly. Nutrition: appropriate balanced diet. Last tetanus shot was unknown. Patient is doing routine self breast exams: monthly    Patient has had [facility], Dr. Sharifa Almonte last pap done 10/04/2018, negative for intraepithelial lesion.  Patient states she has not had menstrual periods since endometrial ablation.  She does occasionally have some mild spotting.  Mammogram 3/16/2022 BI-RADS 2, [facility].    Quit Meloxicam due to it causing burning in chest, taking Tums and Pepcid only couple times a week.     Very stiff in back shoulders and neck, \"sound like rice crispies\" when stand  Muscle relaxers help but make very tired the next day. Only take rarely, if have a bad migraine, maybe 2 times a month    Imitrex does help with migraines. Headaches can last 3 to 4 days. Returning after Imitrex wears off after 8-10 hours will take 2 doses daily for the 3 to 4 days.  No major side effects.  Do need a refill.    PHQ-9 Depression Screening  Little interest or pleasure in doing things? 0-->not at all   Feeling down, depressed, or hopeless? 0-->not at all   Trouble falling or staying asleep, or sleeping too much?     Feeling tired or having little energy?     Poor appetite or overeating?     Feeling bad about yourself - or that you are a failure or have let yourself or your " family down?     Trouble concentrating on things, such as reading the newspaper or watching television?     Moving or speaking so slowly that other people could have noticed? Or the opposite - being so fidgety or restless that you have been moving around a lot more than usual?     Thoughts that you would be better off dead, or of hurting yourself in some way?     PHQ-9 Total Score 0   If you checked off any problems, how difficult have these problems made it for you to do your work, take care of things at home, or get along with other people?             Recent Hospitalizations:  No hospitalization(s) within the last year..  ccc      I personally reviewed and updated the patient's allergies, medications, problem list, and past medical, surgical, social, and family history. I have reviewed and confirmed the accuracy of the HPI and ROS as documented by the MA/LPN/RN Dee Segovia MD    Family History   Problem Relation Age of Onset   • Cancer Mother         breast   • Diabetes Father    • Hypertension Father    • Heart disease Father    • Parkinsonism Maternal Grandmother    • Parkinsonism Paternal Grandmother    • Diabetes Paternal Grandfather    • Cancer Son         breast        Social History     Tobacco Use   • Smoking status: Never   • Smokeless tobacco: Never   Vaping Use   • Vaping Use: Never used   Substance Use Topics   • Alcohol use: Not Currently   • Drug use: Never       Past Surgical History:   Procedure Laterality Date   • DENTAL PROCEDURE      implant   • TUBAL ABDOMINAL LIGATION         Patient Active Problem List   Diagnosis   • Hyperlipidemia   • Migraines   • GERD (gastroesophageal reflux disease)   • IBS (irritable bowel syndrome)   • Family history of breast cancer in mother   • Overweight (BMI 25.0-29.9)   • Chronic left shoulder pain   • Lumbar back pain   • Chronic midline thoracic back pain         Current Outpatient Medications:   •  acetaminophen (TYLENOL) 650 MG 8 hr tablet, 1 q  "am for muscle back pain, Disp: 90 tablet, Rfl: 3  •  cyclobenzaprine (FLEXERIL) 10 MG tablet, Take 1 tablet by mouth 3 (Three) Times a Day As Needed for Muscle Spasms., Disp: 30 tablet, Rfl: 1  •  SUMAtriptan (IMITREX) 100 MG tablet, Take 1 tablet by mouth Daily. May repeat in 2 hours if need, max 2 in 24 hours, Disp: 18 tablet, Rfl: 2  •  celecoxib (CeleBREX) 200 MG capsule, Take 1 capsule by mouth Daily., Disp: 30 capsule, Rfl: 3  •  famotidine (PEPCID) 40 MG tablet, Take 1 tablet by mouth Every Night., Disp: 30 tablet, Rfl: 2  •  Scopolamine (Transderm-Scop, 1.5 MG,) 1.5 MG/3DAYS patch, Place 1 patch on the skin as directed by provider Every 72 (Seventy-Two) Hours. Starting at least 4 hours prior to travel, Disp: 4 patch, Rfl: 1    Objective   /64 (BP Location: Right arm, Patient Position: Sitting, Cuff Size: Adult)   Pulse 94   Temp 98.6 °F (37 °C) (Temporal)   Resp 18   Ht 160 cm (63\")   Wt 65.8 kg (145 lb)   SpO2 98% Comment: room air  BMI 25.69 kg/m²   BP Readings from Last 3 Encounters:   05/22/23 122/64   04/12/22 126/90   03/07/22 127/68     Wt Readings from Last 3 Encounters:   05/22/23 65.8 kg (145 lb)   04/12/22 69.9 kg (154 lb 3.2 oz)   03/07/22 69.9 kg (154 lb)     Physical Exam  Vitals and nursing note reviewed.   Constitutional:       General: She is not in acute distress.     Appearance: She is well-developed.   HENT:      Head: Normocephalic and atraumatic.      Right Ear: External ear normal.      Left Ear: External ear normal.      Nose: Nose normal.   Eyes:      General: No scleral icterus.        Right eye: No discharge.         Left eye: No discharge.      Conjunctiva/sclera: Conjunctivae normal.      Pupils: Pupils are equal, round, and reactive to light.   Cardiovascular:      Rate and Rhythm: Normal rate and regular rhythm.      Heart sounds: No murmur heard.  Pulmonary:      Effort: Pulmonary effort is normal.      Breath sounds: No wheezing.   Abdominal:      General: Bowel " sounds are normal. There is no distension.      Palpations: Abdomen is soft. There is no mass.      Tenderness: There is no abdominal tenderness.      Hernia: No hernia is present.   Genitourinary:     Vagina: Normal. No vaginal discharge.      Adnexa:         Right: No tenderness.          Left: No tenderness.        Comments: Pelvic exam: normal external genitalia, vulva, vagina, cervix, uterus and adnexa.  There is a small amount of bright red blood in the posterior fornix.  There are very small adherent white patches on the walls of the vagina.  Pap obtained  Musculoskeletal:         General: Normal range of motion.      Cervical back: Normal range of motion.   Lymphadenopathy:      Cervical: No cervical adenopathy.   Skin:     General: Skin is warm and dry.   Neurological:      Mental Status: She is alert and oriented to person, place, and time.   Psychiatric:         Mood and Affect: Mood normal.         Behavior: Behavior normal.         Thought Content: Thought content normal.         Data / Lab Results:  No results found for: HGBA1C      The 10-year ASCVD risk score (González DK, et al., 2019) is: 1.3%    Values used to calculate the score:      Age: 42 years      Sex: Female      Is Non- : No      Diabetic: No      Tobacco smoker: No      Systolic Blood Pressure: 122 mmHg      Is BP treated: No      HDL Cholesterol: 41 mg/dL      Total Cholesterol: 226 mg/dL    Age-appropriate Screening Schedule:  Refer to the list below for future screening recommendations based on patient's age, sex and/or medical conditions. Orders for these recommended tests are listed in the plan section. The patient has been provided with a written plan.    Health Maintenance   Topic Date Due   • TDAP/TD VACCINES (1 - Tdap) Never done   • LIPID PANEL  02/17/2023   • ANNUAL PHYSICAL  04/12/2023   • INFLUENZA VACCINE  08/01/2023   • PAP SMEAR  03/07/2025   • HEPATITIS C SCREENING  Completed   • COVID-19 Vaccine   Completed   • Pneumococcal Vaccine 0-64  Aged Out           Assessment & Plan      Medications        Problem List         LOS        Diagnoses and all orders for this visit:    1. Annual physical exam (Primary)    2. Migraine without aura and without status migrainosus, not intractable  -     SUMAtriptan (IMITREX) 100 MG tablet; Take 1 tablet by mouth Daily. May repeat in 2 hours if need, max 2 in 24 hours  Dispense: 18 tablet; Refill: 2    3. Cervical cancer screening  -     IGP,CtNg,AptimaHPV,rfx16 / 18,45    4. Overweight (BMI 25.0-29.9)  Assessment & Plan:  Patient's (Body mass index is 25.69 kg/m².) indicates that they are overweight with health conditions that include dyslipidemias and GERD . Weight is unchanged. BMI is is above average; BMI management plan is completed. We discussed portion control and increasing exercise.       5. Mixed hyperlipidemia  -     Comprehensive Metabolic Panel  -     Lipid Panel    6. Spondylosis of lumbar spine  -     celecoxib (CeleBREX) 200 MG capsule; Take 1 capsule by mouth Daily.  Dispense: 30 capsule; Refill: 3  -     C-reactive Protein  -     Sedimentation Rate    7. Lumbar back pain    8. Chronic midline thoracic back pain    9. Chronic left shoulder pain    10. Chronic pain of both shoulders  -     celecoxib (CeleBREX) 200 MG capsule; Take 1 capsule by mouth Daily.  Dispense: 30 capsule; Refill: 3  -     C-reactive Protein  -     Sedimentation Rate    11. Gastroesophageal reflux disease, unspecified whether esophagitis present  -     famotidine (PEPCID) 40 MG tablet; Take 1 tablet by mouth Every Night.  Dispense: 30 tablet; Refill: 2    12. Screening for diabetes mellitus  -     Comprehensive Metabolic Panel    13. History of anemia  -     CBC & Differential      The patient was counseled regarding nutrition, physical activity, healthy weight, injury prevention, immunizations and preventative health screenings.  Recommend  Tdap otherwise vaccinations are  up-to-date  Screening labs as ordered above.  Chronic low back pain, thoracic pain and shoulder pain and stiffness consistent with arthritis.  Patient developed epigastric discomfort with daily meloxicam use and self discontinued.  Advised may take Tylenol and use topicals such as Voltaren gel, Biofreeze, or lidocaine patches.  Prescription for Celebrex.  Advised to avoid other nonsteroidal anti-inflammatory such as ibuprofen, Motrin, Midol, Advil, naproxen, or Aleve while taking Celebrex.  Patient declines referral to physical therapy.    Reflux, advised to take Pepcid daily for at least the next month, while taking Celebrex.  If gastritis symptoms resolve may try to wean off of medication.  History of migraines, renewing Imitrex.    Return in about 1 year (around 5/22/2024) for Annual physical or as needed.      Expected course, medications, and adverse effects discussed.    Call or return if worsening or persistent symptoms.  Hand hygiene was performed before and after exam.   The complete contents of the Assessment and Plan and Data / Lab Results as documented above have been reviewed and addressed by myself with the patient today as part of an ongoing evaluation / treatment plan.    If separate E/M service has been provided today it was significant, medically necessary, and separately identifiable.

## 2023-05-23 LAB
ALBUMIN SERPL-MCNC: 4.6 G/DL (ref 3.8–4.8)
ALBUMIN/GLOB SERPL: 2.2 {RATIO} (ref 1.2–2.2)
ALP SERPL-CCNC: 56 IU/L (ref 44–121)
ALT SERPL-CCNC: 25 IU/L (ref 0–32)
AST SERPL-CCNC: 21 IU/L (ref 0–40)
BASOPHILS # BLD AUTO: 0 X10E3/UL (ref 0–0.2)
BASOPHILS NFR BLD AUTO: 0 %
BILIRUB SERPL-MCNC: 0.3 MG/DL (ref 0–1.2)
BUN SERPL-MCNC: 11 MG/DL (ref 6–24)
BUN/CREAT SERPL: 21 (ref 9–23)
CALCIUM SERPL-MCNC: 9.5 MG/DL (ref 8.7–10.2)
CHLORIDE SERPL-SCNC: 105 MMOL/L (ref 96–106)
CHOLEST SERPL-MCNC: 191 MG/DL (ref 100–199)
CO2 SERPL-SCNC: 24 MMOL/L (ref 20–29)
CREAT SERPL-MCNC: 0.53 MG/DL (ref 0.57–1)
CRP SERPL-MCNC: 6 MG/L (ref 0–10)
EGFRCR SERPLBLD CKD-EPI 2021: 118 ML/MIN/1.73
EOSINOPHIL # BLD AUTO: 0.1 X10E3/UL (ref 0–0.4)
EOSINOPHIL NFR BLD AUTO: 1 %
ERYTHROCYTE [DISTWIDTH] IN BLOOD BY AUTOMATED COUNT: 13.1 % (ref 11.7–15.4)
ERYTHROCYTE [SEDIMENTATION RATE] IN BLOOD BY WESTERGREN METHOD: 9 MM/HR (ref 0–32)
GLOBULIN SER CALC-MCNC: 2.1 G/DL (ref 1.5–4.5)
GLUCOSE SERPL-MCNC: 127 MG/DL (ref 70–99)
HCT VFR BLD AUTO: 41.8 % (ref 34–46.6)
HDLC SERPL-MCNC: 38 MG/DL
HGB BLD-MCNC: 14.1 G/DL (ref 11.1–15.9)
IMM GRANULOCYTES # BLD AUTO: 0 X10E3/UL (ref 0–0.1)
IMM GRANULOCYTES NFR BLD AUTO: 0 %
LDLC SERPL CALC-MCNC: 136 MG/DL (ref 0–99)
LYMPHOCYTES # BLD AUTO: 3.1 X10E3/UL (ref 0.7–3.1)
LYMPHOCYTES NFR BLD AUTO: 37 %
MCH RBC QN AUTO: 30.3 PG (ref 26.6–33)
MCHC RBC AUTO-ENTMCNC: 33.7 G/DL (ref 31.5–35.7)
MCV RBC AUTO: 90 FL (ref 79–97)
MONOCYTES # BLD AUTO: 0.5 X10E3/UL (ref 0.1–0.9)
MONOCYTES NFR BLD AUTO: 6 %
NEUTROPHILS # BLD AUTO: 4.7 X10E3/UL (ref 1.4–7)
NEUTROPHILS NFR BLD AUTO: 56 %
PLATELET # BLD AUTO: 266 X10E3/UL (ref 150–450)
POTASSIUM SERPL-SCNC: 4.2 MMOL/L (ref 3.5–5.2)
PROT SERPL-MCNC: 6.7 G/DL (ref 6–8.5)
RBC # BLD AUTO: 4.66 X10E6/UL (ref 3.77–5.28)
SODIUM SERPL-SCNC: 141 MMOL/L (ref 134–144)
TRIGL SERPL-MCNC: 91 MG/DL (ref 0–149)
VLDLC SERPL CALC-MCNC: 17 MG/DL (ref 5–40)
WBC # BLD AUTO: 8.4 X10E3/UL (ref 3.4–10.8)

## 2023-05-26 LAB
C TRACH RRNA CVX QL NAA+PROBE: NEGATIVE
CYTOLOGIST CVX/VAG CYTO: NORMAL
CYTOLOGY CVX/VAG DOC CYTO: NORMAL
CYTOLOGY CVX/VAG DOC THIN PREP: NORMAL
DX ICD CODE: NORMAL
HIV 1 & 2 AB SER-IMP: NORMAL
HPV GENOTYPE REFLEX: NORMAL
HPV I/H RISK 4 DNA CVX QL PROBE+SIG AMP: NEGATIVE
N GONORRHOEA RRNA CVX QL NAA+PROBE: NEGATIVE
OTHER STN SPEC: NORMAL
STAT OF ADQ CVX/VAG CYTO-IMP: NORMAL

## 2023-05-27 DIAGNOSIS — B37.31 VAGINAL CANDIDA: Primary | ICD-10-CM

## 2023-05-27 RX ORDER — FLUCONAZOLE 150 MG/1
150 TABLET ORAL ONCE
Qty: 1 TABLET | Refills: 0 | Status: SHIPPED | OUTPATIENT
Start: 2023-05-27 | End: 2023-05-27

## 2023-05-27 NOTE — PROGRESS NOTES
Please contact patient and inform that Pap is negative for intraepithelial lesions or malignancies and high risk HPV testing is also negative, GC and Chlamydia are also negative.  Testing did show yeast, I know you were not having any symptoms at time of appointment, but I would recommend treatment.  I am sending in a prescription for Diflucan to [facility] in [place], this is a single dose.  If you do develop vaginal itching or discharge in the future may need to repeat treatment.

## 2023-06-16 DIAGNOSIS — K21.9 GASTROESOPHAGEAL REFLUX DISEASE, UNSPECIFIED WHETHER ESOPHAGITIS PRESENT: ICD-10-CM

## 2023-06-16 RX ORDER — FAMOTIDINE 40 MG/1
TABLET, FILM COATED ORAL
Qty: 30 TABLET | Refills: 2 | Status: SHIPPED | OUTPATIENT
Start: 2023-06-16

## 2023-07-25 DIAGNOSIS — M54.6 CHRONIC MIDLINE THORACIC BACK PAIN: ICD-10-CM

## 2023-07-25 DIAGNOSIS — M54.50 LUMBAR BACK PAIN: ICD-10-CM

## 2023-07-25 DIAGNOSIS — G89.29 CHRONIC LEFT SHOULDER PAIN: ICD-10-CM

## 2023-07-25 DIAGNOSIS — M25.512 CHRONIC LEFT SHOULDER PAIN: ICD-10-CM

## 2023-07-25 DIAGNOSIS — G89.29 CHRONIC MIDLINE THORACIC BACK PAIN: ICD-10-CM

## 2023-07-25 RX ORDER — CYCLOBENZAPRINE HCL 10 MG
TABLET ORAL
Qty: 30 TABLET | Refills: 0 | Status: SHIPPED | OUTPATIENT
Start: 2023-07-25

## 2023-09-11 DIAGNOSIS — K21.9 GASTROESOPHAGEAL REFLUX DISEASE, UNSPECIFIED WHETHER ESOPHAGITIS PRESENT: ICD-10-CM

## 2023-09-11 RX ORDER — FAMOTIDINE 40 MG/1
TABLET, FILM COATED ORAL
Qty: 90 TABLET | Refills: 3 | Status: SHIPPED | OUTPATIENT
Start: 2023-09-11

## 2023-12-21 DIAGNOSIS — G43.009 MIGRAINE WITHOUT AURA AND WITHOUT STATUS MIGRAINOSUS, NOT INTRACTABLE: ICD-10-CM

## 2023-12-21 RX ORDER — SUMATRIPTAN 100 MG/1
100 TABLET, FILM COATED ORAL DAILY
Qty: 18 TABLET | Refills: 2 | Status: SHIPPED | OUTPATIENT
Start: 2023-12-21

## 2024-03-19 ENCOUNTER — TELEPHONE (OUTPATIENT)
Dept: FAMILY MEDICINE CLINIC | Facility: CLINIC | Age: 44
End: 2024-03-19
Payer: COMMERCIAL

## 2024-03-19 DIAGNOSIS — Z12.31 ENCOUNTER FOR SCREENING MAMMOGRAM FOR MALIGNANT NEOPLASM OF BREAST: Primary | ICD-10-CM

## 2024-03-19 DIAGNOSIS — Z12.31 ENCOUNTER FOR SCREENING MAMMOGRAM FOR BREAST CANCER: ICD-10-CM

## 2024-03-19 NOTE — TELEPHONE ENCOUNTER
Caller: Bren Rivas    Relationship: Self    Best call back number: [phone]     What was the call regarding: REQUEST REFERRAL FOR MAMMOGRAM

## 2024-03-19 NOTE — TELEPHONE ENCOUNTER
Reviewed chart she is due for screening mammogram, changed order to screening mammogram and signed

## 2024-03-28 ENCOUNTER — HOSPITAL ENCOUNTER (OUTPATIENT)
Dept: MAMMOGRAPHY | Facility: HOSPITAL | Age: 44
Discharge: HOME | End: 2024-03-28
Admitting: FAMILY MEDICINE
Payer: COMMERCIAL

## 2024-03-28 DIAGNOSIS — Z12.31 ENCOUNTER FOR SCREENING MAMMOGRAM FOR BREAST CANCER: ICD-10-CM

## 2024-03-28 DIAGNOSIS — Z12.31 ENCOUNTER FOR SCREENING MAMMOGRAM FOR MALIGNANT NEOPLASM OF BREAST: ICD-10-CM

## 2024-03-28 PROCEDURE — 77063 BREAST TOMOSYNTHESIS BI: CPT

## 2024-03-28 PROCEDURE — 77067 SCR MAMMO BI INCL CAD: CPT

## 2024-04-02 ENCOUNTER — TELEPHONE (OUTPATIENT)
Dept: FAMILY MEDICINE CLINIC | Facility: CLINIC | Age: 44
End: 2024-04-02
Payer: COMMERCIAL

## 2024-04-02 DIAGNOSIS — R92.8 ABNORMALITY OF RIGHT BREAST ON SCREENING MAMMOGRAM: Primary | ICD-10-CM

## 2024-04-02 NOTE — TELEPHONE ENCOUNTER
Caller: Bren Rivas    Relationship: Self    Best call back number: [phone]    What test was performed: MAMMOGRAM     When was the test performed: 03/28/2024     Where was the test performed: [facility]     Additional notes: PATIENT STATES SHE IS WANTING TO KNOW IF THE RESULTS ARE BACK YET FROM HER MAMMOGRAM LAST WEEK.     PATIENT IS REQUESTING A CALL BACK.

## 2024-04-02 NOTE — TELEPHONE ENCOUNTER
Yes, the report shows some right breast asymmetries and was read as BI-RADS 0, Incomplete.  Radiologist recommends further evaluation I am placing an order for a diagnostic mammogram and possible right breast ultrasound if needed.

## 2024-04-16 ENCOUNTER — HOSPITAL ENCOUNTER (OUTPATIENT)
Dept: MAMMOGRAPHY | Facility: HOSPITAL | Age: 44
Discharge: HOME | End: 2024-04-16
Payer: COMMERCIAL

## 2024-04-16 ENCOUNTER — HOSPITAL ENCOUNTER (OUTPATIENT)
Dept: ULTRASOUND IMAGING | Facility: HOSPITAL | Age: 44
Discharge: HOME | End: 2024-04-16
Payer: COMMERCIAL

## 2024-04-16 DIAGNOSIS — R92.8 ABNORMALITY OF RIGHT BREAST ON SCREENING MAMMOGRAM: ICD-10-CM

## 2024-04-16 PROCEDURE — 76642 ULTRASOUND BREAST LIMITED: CPT

## 2024-04-16 PROCEDURE — G0279 TOMOSYNTHESIS, MAMMO: HCPCS

## 2024-04-16 PROCEDURE — 77065 DX MAMMO INCL CAD UNI: CPT

## 2024-04-22 NOTE — PROGRESS NOTES
Please contact patient and inform that breast ultrasound was negative for any concerning breast pathology.  Did indicate some asymmetric glandular tissue and a benign cyst.  Recommendation is to follow-up with routine annual screening mammography in March 2025.

## 2024-05-29 NOTE — PROGRESS NOTES
Chief Complaint   Patient presents with    Annual Exam       History of Present Illness  Mikaela Rivas is a 43 y.o. female.       The patient is here: for coordination of medical care.  Last comprehensive physical was on 05/22/2023.  Previous physical was performed by  Dee Segovia MD  Overall has: minimal activity with work/home activities, good appetite, feels well with minor complaints, good energy level, and is sleeping well. Nutrition: eating a variety of foods. Last tetanus shot was unknown. Patient is doing routine self breast exams: monthly    Migraines:  headaches every 2 weeks last 4-5 days. Start in one temple and progresses to thropbinh, may cause blurry vision. Sometimes nausea, no vomiting. Imitrex helps some but dull headache persists and makes irritable. Tylenol advil excedrine are not helpful. Will get an uspecific aura preceding headaches.    Will take cyclobenzaprine at bed time when muscle tightness.   Sleep 5-6 hours a night. Hard time falling asleep in bed at 9:30 toss and turn ut to a couple hours.   Have not seen a neurologist.            Health Maintenance   Topic Date Due    TDAP/TD VACCINES (1 - Tdap) Never done    COVID-19 Vaccine (4 - 2023-24 season) 09/01/2023    ANNUAL PHYSICAL  05/22/2024    BMI FOLLOWUP  05/22/2024    INFLUENZA VACCINE  08/01/2024    LIPID PANEL  05/22/2025    MAMMOGRAM  04/16/2026    PAP SMEAR  05/22/2026    HEPATITIS C SCREENING  Completed    Pneumococcal Vaccine 0-64  Aged Out       PHQ-9 Depression Screening  Little interest or pleasure in doing things? 0-->not at all   Feeling down, depressed, or hopeless? 0-->not at all   Trouble falling or staying asleep, or sleeping too much?     Feeling tired or having little energy?     Poor appetite or overeating?     Feeling bad about yourself - or that you are a failure or have let yourself or your family down?     Trouble concentrating on things, such as reading the newspaper or watching  television?     Moving or speaking so slowly that other people could have noticed? Or the opposite - being so fidgety or restless that you have been moving around a lot more than usual?     Thoughts that you would be better off dead, or of hurting yourself in some way?     PHQ-9 Total Score 0   If you checked off any problems, how difficult have these problems made it for you to do your work, take care of things at home, or get along with other people?             Recent Hospitalizations:  No hospitalization(s) within the last year..  ccc      I personally reviewed and updated the patient's allergies, medications, problem list, and past medical, surgical, social, and family history. I have reviewed and confirmed the accuracy of the HPI and ROS as documented by the MA/LPN/RN Dee Segovia MD    Family History   Problem Relation Age of Onset    Cancer Mother         breast    Diabetes Father     Hypertension Father     Heart disease Father     Parkinsonism Maternal Grandmother     Parkinsonism Paternal Grandmother     Diabetes Paternal Grandfather     Cancer Son         breast        Social History     Tobacco Use    Smoking status: Never    Smokeless tobacco: Never   Vaping Use    Vaping status: Never Used   Substance Use Topics    Alcohol use: Not Currently    Drug use: Never       Past Surgical History:   Procedure Laterality Date    DENTAL PROCEDURE      implant    TUBAL ABDOMINAL LIGATION         Patient Active Problem List   Diagnosis    Hyperlipidemia    Migraines    GERD (gastroesophageal reflux disease)    IBS (irritable bowel syndrome)    Family history of breast cancer in mother    Overweight (BMI 25.0-29.9)    Chronic left shoulder pain    Lumbar back pain    Chronic midline thoracic back pain         Current Outpatient Medications:     acetaminophen (TYLENOL) 650 MG 8 hr tablet, 1 q am for muscle back pain, Disp: 90 tablet, Rfl: 3    cyclobenzaprine (FLEXERIL) 10 MG tablet, Take 1 tablet by  "mouth 3 (Three) Times a Day As Needed for Muscle Spasms., Disp: 30 tablet, Rfl: 2    SUMAtriptan (IMITREX) 100 MG tablet, Take 1 tablet by mouth Daily. May repeat in 2 hours if need, max 2 in 24 hours, Disp: 18 tablet, Rfl: 2    amitriptyline (ELAVIL) 10 MG tablet, Take 1 tablet by mouth Every Night. Increase by 10 mg every 3 days as needed for headache prevention to a max of 50 mg., Disp: 90 tablet, Rfl: 1    celecoxib (CeleBREX) 200 MG capsule, Take 1 capsule by mouth Daily. (Patient not taking: Reported on 5/31/2024), Disp: 30 capsule, Rfl: 3    famotidine (PEPCID) 40 MG tablet, TAKE 1 TABLET BY MOUTH EVERY DAY AT NIGHT (Patient not taking: Reported on 5/31/2024), Disp: 90 tablet, Rfl: 3    Scopolamine (Transderm-Scop, 1.5 MG,) 1.5 MG/3DAYS patch, Place 1 patch on the skin as directed by provider Every 72 (Seventy-Two) Hours. Starting at least 4 hours prior to travel (Patient not taking: Reported on 5/31/2024), Disp: 4 patch, Rfl: 1    Objective   /68 (BP Location: Right arm, Patient Position: Sitting, Cuff Size: Large Adult)   Pulse 102   Temp 98.4 °F (36.9 °C) (Temporal)   Resp 8   Ht 160 cm (63\")   Wt 65.8 kg (145 lb)   SpO2 99%   BMI 25.69 kg/m²   BP Readings from Last 3 Encounters:   05/31/24 126/68   05/22/23 122/64   04/12/22 126/90     Wt Readings from Last 3 Encounters:   05/31/24 65.8 kg (145 lb)   05/22/23 65.8 kg (145 lb)   04/12/22 69.9 kg (154 lb 3.2 oz)     Physical Exam  Vitals and nursing note reviewed.   Constitutional:       General: She is not in acute distress.     Appearance: Normal appearance. She is well-developed.   HENT:      Head: Normocephalic and atraumatic.      Right Ear: External ear normal.      Left Ear: External ear normal.      Nose: Nose normal.   Eyes:      General: No scleral icterus.        Right eye: No discharge.         Left eye: No discharge.      Conjunctiva/sclera: Conjunctivae normal.      Pupils: Pupils are equal, round, and reactive to light.   Neck: " "     Thyroid: No thyromegaly.      Vascular: No JVD.   Cardiovascular:      Rate and Rhythm: Normal rate and regular rhythm.      Heart sounds: Normal heart sounds. No murmur heard.  Pulmonary:      Effort: Pulmonary effort is normal.      Breath sounds: Normal breath sounds. No wheezing or rales.   Chest:   Breasts:     Breasts are symmetrical.      Right: No mass, nipple discharge, skin change or tenderness.      Left: No mass, nipple discharge, skin change or tenderness.   Abdominal:      General: Bowel sounds are normal. There is no distension.      Palpations: Abdomen is soft. There is no mass.      Tenderness: There is no abdominal tenderness.      Hernia: No hernia is present.   Musculoskeletal:         General: Normal range of motion.      Cervical back: Normal range of motion.   Lymphadenopathy:      Cervical: No cervical adenopathy.   Skin:     General: Skin is warm and dry.      Findings: No rash.   Neurological:      Mental Status: She is alert and oriented to person, place, and time.   Psychiatric:         Mood and Affect: Mood normal.         Behavior: Behavior normal.         Data / Lab Results:  No results found for: \"HGBA1C\"        Age-appropriate Screening Schedule:  Refer to the list below for future screening recommendations based on patient's age, sex and/or medical conditions. Orders for these recommended tests are listed in the plan section. The patient has been provided with a written plan.        Assessment & Plan      Medications        Problem List         LOS        Diagnoses and all orders for this visit:    1. Annual physical exam (Primary)    2. Overweight (BMI 25.0-29.9)    3. Chronic midline thoracic back pain  -     cyclobenzaprine (FLEXERIL) 10 MG tablet; Take 1 tablet by mouth 3 (Three) Times a Day As Needed for Muscle Spasms.  Dispense: 30 tablet; Refill: 2    4. Lumbar back pain  -     cyclobenzaprine (FLEXERIL) 10 MG tablet; Take 1 tablet by mouth 3 (Three) Times a Day As " Needed for Muscle Spasms.  Dispense: 30 tablet; Refill: 2    5. Chronic left shoulder pain  -     cyclobenzaprine (FLEXERIL) 10 MG tablet; Take 1 tablet by mouth 3 (Three) Times a Day As Needed for Muscle Spasms.  Dispense: 30 tablet; Refill: 2    6. Migraine without aura and without status migrainosus, not intractable  -     SUMAtriptan (IMITREX) 100 MG tablet; Take 1 tablet by mouth Daily. May repeat in 2 hours if need, max 2 in 24 hours  Dispense: 18 tablet; Refill: 2  -     amitriptyline (ELAVIL) 10 MG tablet; Take 1 tablet by mouth Every Night. Increase by 10 mg every 3 days as needed for headache prevention to a max of 50 mg.  Dispense: 90 tablet; Refill: 1        The patient was counseled regarding nutrition, physical activity, healthy weight, injury prevention, immunizations and preventative health screenings.  Do recommend tetanus vaccination and COVID vaccination in the fall with flu shot and still recommended by the CDC.  Patient has already had mammogram with repeat diagnostic and ultrasound revealing BI-RADS 2 abnormality will repeat annually.    Migraines have become less responsive to abortive Imitrex therapy lasting up to 5 days despite taking multiple doses of Imitrex after discussion we are going to try a prophylactic treatment with amitriptyline starting at 10 mg at bedtime increasing to a goal of 20 to 30 mg and a maximum of 50 mg (prior to recheck/further direction from myself).    Patient has ongoing shoulder and back pain.  She is not currently taking Celebrex and does not want to take any more medicines necessary, likely adding amitriptyline for migraine prophylaxis can also help some of her chronic pain.  Renewing Imitrex for migraine abortive therapy and Flexeril for as needed use for her back pain.  She may use Tylenol and topicals as needed    Previous reflux significantly improved, no longer needing Pepcid,  She may use on an as-needed basis.  Return in about 2 months (around 7/31/2024)  for migraines.      Expected course, medications, and adverse effects discussed.    Call or return if worsening or persistent symptoms.   Hand hygiene was performed before and after exam.   The complete contents of the Assessment and Plan and Data / Lab Results as documented above have been reviewed and addressed by myself with the patient today as part of an ongoing evaluation / treatment plan.    If separate E/M service has been provided today it was significant, medically necessary, and separately identifiable.

## 2024-05-31 ENCOUNTER — OFFICE VISIT (OUTPATIENT)
Dept: FAMILY MEDICINE CLINIC | Facility: CLINIC | Age: 44
End: 2024-05-31
Payer: COMMERCIAL

## 2024-05-31 VITALS
OXYGEN SATURATION: 99 % | RESPIRATION RATE: 8 BRPM | HEART RATE: 102 BPM | HEIGHT: 63 IN | WEIGHT: 145 LBS | TEMPERATURE: 98.4 F | DIASTOLIC BLOOD PRESSURE: 68 MMHG | SYSTOLIC BLOOD PRESSURE: 126 MMHG | BODY MASS INDEX: 25.69 KG/M2

## 2024-05-31 DIAGNOSIS — G89.29 CHRONIC LEFT SHOULDER PAIN: ICD-10-CM

## 2024-05-31 DIAGNOSIS — M25.512 CHRONIC LEFT SHOULDER PAIN: ICD-10-CM

## 2024-05-31 DIAGNOSIS — G89.29 CHRONIC MIDLINE THORACIC BACK PAIN: ICD-10-CM

## 2024-05-31 DIAGNOSIS — E66.3 OVERWEIGHT (BMI 25.0-29.9): ICD-10-CM

## 2024-05-31 DIAGNOSIS — M54.50 LUMBAR BACK PAIN: ICD-10-CM

## 2024-05-31 DIAGNOSIS — G43.009 MIGRAINE WITHOUT AURA AND WITHOUT STATUS MIGRAINOSUS, NOT INTRACTABLE: ICD-10-CM

## 2024-05-31 DIAGNOSIS — Z00.00 ANNUAL PHYSICAL EXAM: Primary | ICD-10-CM

## 2024-05-31 DIAGNOSIS — M54.6 CHRONIC MIDLINE THORACIC BACK PAIN: ICD-10-CM

## 2024-05-31 PROCEDURE — 99214 OFFICE O/P EST MOD 30 MIN: CPT | Performed by: FAMILY MEDICINE

## 2024-05-31 PROCEDURE — 99396 PREV VISIT EST AGE 40-64: CPT | Performed by: FAMILY MEDICINE

## 2024-05-31 RX ORDER — CYCLOBENZAPRINE HCL 10 MG
10 TABLET ORAL 3 TIMES DAILY PRN
Qty: 30 TABLET | Refills: 2 | Status: SHIPPED | OUTPATIENT
Start: 2024-05-31

## 2024-05-31 RX ORDER — AMITRIPTYLINE HYDROCHLORIDE 10 MG/1
10 TABLET, FILM COATED ORAL NIGHTLY
Qty: 90 TABLET | Refills: 1 | Status: SHIPPED | OUTPATIENT
Start: 2024-05-31

## 2024-05-31 RX ORDER — SUMATRIPTAN 100 MG/1
100 TABLET, FILM COATED ORAL DAILY
Qty: 18 TABLET | Refills: 2 | Status: SHIPPED | OUTPATIENT
Start: 2024-05-31

## 2025-04-02 ENCOUNTER — TELEPHONE (OUTPATIENT)
Dept: FAMILY MEDICINE CLINIC | Facility: CLINIC | Age: 45
End: 2025-04-02
Payer: COMMERCIAL

## 2025-04-02 DIAGNOSIS — Z12.31 ENCOUNTER FOR SCREENING MAMMOGRAM FOR BREAST CANCER: Primary | ICD-10-CM

## 2025-04-02 NOTE — TELEPHONE ENCOUNTER
Caller: Bren Rivas    Relationship: Self    Best call back number: [phone]    What is the medical concern/diagnosis: ROUTINE    What specialty or service is being requested: MAMMOGRAM     What is the provider, practice or medical service name: [facility]     What is the office location:     What is the office phone number:     Any additional details: PLEASE CALL TO CONFIRM OR DENY

## 2025-04-09 ENCOUNTER — HOSPITAL ENCOUNTER (OUTPATIENT)
Dept: MAMMOGRAPHY | Facility: HOSPITAL | Age: 45
Discharge: HOME | End: 2025-04-09
Admitting: FAMILY MEDICINE
Payer: COMMERCIAL

## 2025-04-09 DIAGNOSIS — Z12.31 ENCOUNTER FOR SCREENING MAMMOGRAM FOR BREAST CANCER: ICD-10-CM

## 2025-04-09 PROCEDURE — 77067 SCR MAMMO BI INCL CAD: CPT

## 2025-04-09 PROCEDURE — 77063 BREAST TOMOSYNTHESIS BI: CPT

## 2025-05-28 DIAGNOSIS — G43.009 MIGRAINE WITHOUT AURA AND WITHOUT STATUS MIGRAINOSUS, NOT INTRACTABLE: ICD-10-CM

## 2025-05-28 RX ORDER — SUMATRIPTAN SUCCINATE 100 MG/1
100 TABLET ORAL DAILY
Qty: 18 TABLET | Refills: 2 | Status: SHIPPED | OUTPATIENT
Start: 2025-05-28

## 2025-07-07 NOTE — PROGRESS NOTES
Chief Complaint   Patient presents with    Annual Exam    Migraine    Sore Throat       History of Present Illness  Mikaela Rivas is a 45 y.o. female.     History of Present Illness  The patient is a 45-year-old female who presents for her annual physical, as well as to follow up on migraine headaches, recurrent sore throat, and a spot on her eye.    She experiences intermittent sore throat, which is not severe today. She has had episodes of voice loss a few times this year. She is uncertain if these symptoms are due to allergies or irritation. She occasionally experiences heartburn, but it is not severe. She does not take Pepcid or any other antacids. Her heartburn typically occurs during the day, often after consuming spicy or greasy foods. She rarely uses Tums as her symptoms are not severe. She does not smoke. She works as a  and uses various chemicals such as Windex, toilet bowl , aerosols, and Lysol, which she suspects may be causing her symptoms. She does not report any other allergy symptoms such as sneezing or itchy eyes. She has a dog at home and is not allergic to it. Her home does not have carpet ij.    She has discontinued amitriptyline due to side effects, including excessive tiredness and difficulty waking up in the morning. She uses sumatriptan, which provides relief, but typically requires a second dose after 6 to 7 hours. She typically takes one dose in the morning if she wakes up with a headache and another closer to bedtime to prevent nighttime headaches. She experiences at least 4 to 5 headache days per month and takes between 8 and 10 Imitrex monthly. She is open to trying another preventative treatment. Her migraines are not related to her menstrual cycle.  She has had uterine ablation and does not currently have menstrual cycles. She has not identified specific triggers for her migraines. She tried Nurtec samples, but they did not alleviate her  migraines like sumatriptan. She does not see a neurologist.    She has a small spot on her left lower eye lid that does not itch or cause pain. It has slightly increased in size since it first appeared. She has no family history of similar spots.    She is considering colon cancer screening but is hesitant about colonoscopy due to her inability to tolerate liquid medicine and worried she cannot complete the prep. She has never tried MiraLAX. She has a family history of colon cancer in a cousin. She is unsure about her parents' history of colon polyps. She undergoes annual mammograms and had one in 03/2025. She has a family history of breast cancer in her mother, who carries the BRCA1 gene. She has been tested and does not carry the gene. She is unsure when she last received a tetanus vaccine and doubts it was within the past 10 years.    PAST SURGICAL HISTORY:  Ablation    SOCIAL HISTORY  She does not smoke.    FAMILY HISTORY  She has a cousin who had colon cancer.  Her mother has had breast cancer twice and had a double mastectomy.  Diabetes runs in her family.      The patient is here: for coordination of medical care.  Last comprehensive physical was on 5/31/24.  Previous physical was performed by  Dee Segovia MD  Overall has: moderate activity with work/home activities, exercises 2 - 3 times per week, good appetite, feels well with no complaints, good energy level, and is sleeping well. Nutrition: balanced diet. Last tetanus shot was unknown. Patient is doing routine self breast exams: occasionally.    Migraine   Patient reports that migraines occur 8 per month.    Patient reports migraines does awake from sleep.   Location of pain: left-sided unilateral.  Radiation of pain:anterior neck.  Character of pain:throbbing.  Severity of pain: 8.  Accompanying symptoms: nausea, vomiting, neck stiffness, blurry vision.    Sore Throat: Patient complains of intermittent sore throat. Associated symptoms  include difficulty swallowing. Onset of symptoms was 1 year ago, unchanged since that time. She is drinking plenty of fluids. She has not had recent close exposure to someone with proven streptococcal pharyngitis.    Health Maintenance   Topic Date Due    TDAP/TD VACCINES (1 - Tdap) Never done    LIPID PANEL  05/22/2025    COVID-19 Vaccine (4 - 2024-25 season) 07/22/2025 (Originally 9/1/2024)    COLORECTAL CANCER SCREENING  07/08/2026 (Originally 7/1/2024)    INFLUENZA VACCINE  10/01/2025    PAP SMEAR  05/22/2026    ANNUAL PHYSICAL  07/08/2026    MAMMOGRAM  04/09/2027    HEPATITIS C SCREENING  Completed    Pneumococcal Vaccine 0-49  Aged Out       PHQ-9 Depression Screening  Little interest or pleasure in doing things? Not at all   Feeling down, depressed, or hopeless? Not at all   Trouble falling or staying asleep, or sleeping too much?     Feeling tired or having little energy?     Poor appetite or overeating?     Feeling bad about yourself - or that you are a failure or have let yourself or your family down?     Trouble concentrating on things, such as reading the newspaper or watching television?     Moving or speaking so slowly that other people could have noticed? Or the opposite - being so fidgety or restless that you have been moving around a lot more than usual?     Thoughts that you would be better off dead, or of hurting yourself in some way?     PHQ-9 Total Score     If you checked off any problems, how difficult have these problems made it for you to do your work, take care of things at home, or get along with other people?               Recent Hospitalizations:  No hospitalization(s) within the last year..  ccc      I personally reviewed and updated the patient's allergies, medications, problem list, and past medical, surgical, social, and family history. I have reviewed and confirmed the accuracy of the HPI and ROS as documented by the MA/LPN/RN Dee Segovia MD    Family History   Problem  Relation Age of Onset    Breast cancer Mother     Cancer Mother         breast    Diabetes Father     Hypertension Father     Heart disease Father     Cancer Son         breast     Parkinsonism Maternal Grandmother     Parkinsonism Paternal Grandmother     Breast cancer Maternal Aunt     Diabetes Paternal Grandfather        Social History     Tobacco Use    Smoking status: Never    Smokeless tobacco: Never   Vaping Use    Vaping status: Never Used   Substance Use Topics    Alcohol use: Not Currently    Drug use: Never       Past Surgical History:   Procedure Laterality Date    DENTAL PROCEDURE      implant    TUBAL ABDOMINAL LIGATION         Patient Active Problem List   Diagnosis    Hyperlipidemia    Migraines    GERD (gastroesophageal reflux disease)    IBS (irritable bowel syndrome)    Family history of breast cancer in mother    Overweight (BMI 25.0-29.9)    Chronic left shoulder pain    Lumbar back pain    Chronic midline thoracic back pain    Cholesteatoma of left eyelid    Sore throat         Current Outpatient Medications:     acetaminophen (TYLENOL) 650 MG 8 hr tablet, 1 q am for muscle back pain, Disp: 90 tablet, Rfl: 3    cyclobenzaprine (FLEXERIL) 10 MG tablet, Take 1 tablet by mouth 3 (Three) Times a Day As Needed for Muscle Spasms., Disp: 30 tablet, Rfl: 2    SUMAtriptan (IMITREX) 100 MG tablet, TAKE 1 TABLET BY MOUTH DAILY. MAY REPEAT IN 2 HOURS IF NEED, MAX 2 IN 24 HOURS, Disp: 18 tablet, Rfl: 2    amitriptyline (ELAVIL) 10 MG tablet, Take 1 tablet by mouth Every Night. Increase by 10 mg every 3 days as needed for headache prevention to a max of 50 mg. (Patient not taking: Reported on 7/8/2025), Disp: 90 tablet, Rfl: 1    Atogepant (Qulipta) 60 MG tablet, Take 1 tablet by mouth Daily., Disp: 30 tablet, Rfl: 3    celecoxib (CeleBREX) 200 MG capsule, Take 1 capsule by mouth Daily. (Patient not taking: Reported on 7/8/2025), Disp: 30 capsule, Rfl: 3    famotidine (PEPCID) 40 MG tablet, TAKE 1 TABLET BY  "MOUTH EVERY DAY AT NIGHT (Patient not taking: Reported on 7/8/2025), Disp: 90 tablet, Rfl: 3    Scopolamine (Transderm-Scop, 1.5 MG,) 1.5 MG/3DAYS patch, Place 1 patch on the skin as directed by provider Every 72 (Seventy-Two) Hours. Starting at least 4 hours prior to travel (Patient not taking: Reported on 7/8/2025), Disp: 4 patch, Rfl: 1    Objective   /70 (BP Location: Right arm, Patient Position: Sitting, Cuff Size: Large Adult)   Pulse 100   Temp 98.4 °F (36.9 °C) (Temporal)   Resp 18   Ht 160 cm (63\")   Wt 65 kg (143 lb 6.4 oz)   SpO2 99%   BMI 25.40 kg/m²   BP Readings from Last 3 Encounters:   07/08/25 122/70   05/31/24 126/68   05/22/23 122/64     Wt Readings from Last 3 Encounters:   07/08/25 65 kg (143 lb 6.4 oz)   05/31/24 65.8 kg (145 lb)   05/22/23 65.8 kg (145 lb)     Physical Exam    Physical Exam  Eyes: Pupils equal round reactive to light and accommodation.  Presence of an oblong bifurcated yellow subcutaneous lesion on her left lower eyelid, likely a cholesteatoma.  Mouth/Throat: Oral mucosa is pink and moist without lesion  Throat: No thyromegaly or palpable thyroid masses no lymphadenopathy  Respiratory: Clear to auscultation, no wheezing, rales or rhonchi.  Gastrointestinal: Soft, no tenderness, no distention, no masses.  Musculoskeletal: Normal range of motion normal strength and tone  Neurologic: No focal deficits    Data / Lab Results:  No results found for: \"HGBA1C\"        Results  Imaging   - Mammogram: 04/09/2025, BI-RADS 1,  negative    Age-appropriate Screening Schedule:  Refer to the list below for future screening recommendations based on patient's age, sex and/or medical conditions. Orders for these recommended tests are listed in the plan section. The patient has been provided with a written plan.        Assessment & Plan      Medications        Problem List         LOS        Diagnoses and all orders for this visit:    1. Annual physical exam (Primary)    2. Migraine " without aura and without status migrainosus, not intractable  -     Atogepant (Qulipta) 60 MG tablet; Take 1 tablet by mouth Daily.  Dispense: 30 tablet; Refill: 3    3. Sore throat  -     CBC & Differential  -     TSH Rfx On Abnormal To Free T4    4. Overweight (BMI 25.0-29.9)    5. Screening for diabetes mellitus  -     Hemoglobin A1c  -     Comprehensive Metabolic Panel    6. Screening for cholesterol level  -     Lipid Panel    7. Screening for thyroid disorder  -     TSH Rfx On Abnormal To Free T4    8. Cholesteatoma of left eyelid        Assessment & Plan  1. Recurrent sore throat.  - The recurrent sore throat could be due to acid reflux or allergies. She was advised to monitor for symptoms such as sneezing, itching, or scratchiness, and to consider using less harsh cleaning chemicals like vinegar and water with a drop of detergent.  - Physical exam findings include normal thyroid palpation and no significant abnormalities in the throat or nose.  - The use of a HEPA filter at home was suggested. If symptoms persist, a referral to an ENT specialist or allergist will be considered.  - No medications or therapies prescribed at this time.    2. Migraines.  - She experiences at least 4-5 headache days per month and has tried amitriptyline and Nurtec without success.  - Current medication includes sumatriptan, which she uses 8-10 times per month with some relief.  - A prescription for Qulipta 60 mg daily was provided, with potential side effects including fatigue, nausea, constipation, weight loss, appetite suppression, and possible effects on liver tests discussed. A baseline liver function test will be conducted before starting the medication.  - Follow-up visit scheduled in 3 months to assess the effectiveness of Qulipta.    3. Cholesteatoma.  - The spot on her eye appears to be a cholesteatoma, which is benign and more cosmetic than anything else.  - Physical exam findings indicate no discomfort  - She was  advised not to pick at it to avoid infection or injury.  - No medications or therapies prescribed at this time.    4. Health maintenance.  - She was informed about the option of Cologuard for colon cancer screening but declined at this time.  - A lipid panel and CMP will be ordered today, including liver function tests due to the new migraine medication.  - She was advised to continue annual mammograms due to her family history of breast cancer.  - The importance of the Tdap vaccine was discussed, and she was advised to check with her insurance provider regarding coverage.    Follow-up  - A follow-up visit is scheduled in 3 months for her migraines.    The patient was counseled regarding nutrition, physical activity, healthy weight, injury prevention, immunizations and preventative health screenings.      Return in about 3 months (around 10/8/2025), or migraine and sore throat.      Expected course, medications, and adverse effects discussed.    Call or return if worsening or persistent symptoms.   Hand hygiene was performed before and after exam.   The complete contents of the Assessment and Plan and Data / Lab Results as documented above have been reviewed and addressed by myself with the patient today as part of an ongoing evaluation / treatment plan.    If separate E/M service has been provided today it was significant, medically necessary, and separately identifiable.           Patient or patient representative verbalized consent for the use of Ambient Listening during the visit with  Dee Segovia MD for chart documentation. 7/8/2025  14:05 EDT

## 2025-07-08 ENCOUNTER — OFFICE VISIT (OUTPATIENT)
Dept: FAMILY MEDICINE CLINIC | Facility: CLINIC | Age: 45
End: 2025-07-08
Payer: COMMERCIAL

## 2025-07-08 VITALS
DIASTOLIC BLOOD PRESSURE: 70 MMHG | RESPIRATION RATE: 18 BRPM | WEIGHT: 143.4 LBS | SYSTOLIC BLOOD PRESSURE: 122 MMHG | HEART RATE: 100 BPM | TEMPERATURE: 98.4 F | BODY MASS INDEX: 25.41 KG/M2 | HEIGHT: 63 IN | OXYGEN SATURATION: 99 %

## 2025-07-08 DIAGNOSIS — E66.3 OVERWEIGHT (BMI 25.0-29.9): ICD-10-CM

## 2025-07-08 DIAGNOSIS — Z13.29 SCREENING FOR THYROID DISORDER: ICD-10-CM

## 2025-07-08 DIAGNOSIS — H02.826: ICD-10-CM

## 2025-07-08 DIAGNOSIS — J02.9 SORE THROAT: ICD-10-CM

## 2025-07-08 DIAGNOSIS — G43.009 MIGRAINE WITHOUT AURA AND WITHOUT STATUS MIGRAINOSUS, NOT INTRACTABLE: ICD-10-CM

## 2025-07-08 DIAGNOSIS — Z13.220 SCREENING FOR CHOLESTEROL LEVEL: ICD-10-CM

## 2025-07-08 DIAGNOSIS — Z13.1 SCREENING FOR DIABETES MELLITUS: ICD-10-CM

## 2025-07-08 DIAGNOSIS — Z00.00 ANNUAL PHYSICAL EXAM: Primary | ICD-10-CM

## 2025-07-08 PROCEDURE — 99214 OFFICE O/P EST MOD 30 MIN: CPT | Performed by: FAMILY MEDICINE

## 2025-07-08 PROCEDURE — 99396 PREV VISIT EST AGE 40-64: CPT | Performed by: FAMILY MEDICINE

## 2025-07-08 RX ORDER — ATOGEPANT 60 MG/1
60 TABLET ORAL DAILY
Qty: 30 TABLET | Refills: 3 | Status: SHIPPED | OUTPATIENT
Start: 2025-07-08

## 2025-07-09 LAB
ALBUMIN SERPL-MCNC: 4.6 G/DL (ref 3.9–4.9)
ALP SERPL-CCNC: 72 IU/L (ref 44–121)
ALT SERPL-CCNC: 36 IU/L (ref 0–32)
AST SERPL-CCNC: 28 IU/L (ref 0–40)
BASOPHILS # BLD AUTO: 0 X10E3/UL (ref 0–0.2)
BASOPHILS NFR BLD AUTO: 1 %
BILIRUB SERPL-MCNC: 1 MG/DL (ref 0–1.2)
BUN SERPL-MCNC: 9 MG/DL (ref 6–24)
BUN/CREAT SERPL: 18 (ref 9–23)
CALCIUM SERPL-MCNC: 9.3 MG/DL (ref 8.7–10.2)
CHLORIDE SERPL-SCNC: 99 MMOL/L (ref 96–106)
CHOLEST SERPL-MCNC: 224 MG/DL (ref 100–199)
CO2 SERPL-SCNC: 21 MMOL/L (ref 20–29)
CREAT SERPL-MCNC: 0.51 MG/DL (ref 0.57–1)
EGFRCR SERPLBLD CKD-EPI 2021: 117 ML/MIN/1.73
EOSINOPHIL # BLD AUTO: 0 X10E3/UL (ref 0–0.4)
EOSINOPHIL NFR BLD AUTO: 1 %
ERYTHROCYTE [DISTWIDTH] IN BLOOD BY AUTOMATED COUNT: 12.4 % (ref 11.7–15.4)
GLOBULIN SER CALC-MCNC: 2.4 G/DL (ref 1.5–4.5)
GLUCOSE SERPL-MCNC: 188 MG/DL (ref 70–99)
HBA1C MFR BLD: 10.3 % (ref 4.8–5.6)
HCT VFR BLD AUTO: 44.5 % (ref 34–46.6)
HDLC SERPL-MCNC: 39 MG/DL
HGB BLD-MCNC: 15 G/DL (ref 11.1–15.9)
IMM GRANULOCYTES # BLD AUTO: 0 X10E3/UL (ref 0–0.1)
IMM GRANULOCYTES NFR BLD AUTO: 0 %
LDLC SERPL CALC-MCNC: 154 MG/DL (ref 0–99)
LYMPHOCYTES # BLD AUTO: 2.4 X10E3/UL (ref 0.7–3.1)
LYMPHOCYTES NFR BLD AUTO: 37 %
MCH RBC QN AUTO: 31 PG (ref 26.6–33)
MCHC RBC AUTO-ENTMCNC: 33.7 G/DL (ref 31.5–35.7)
MCV RBC AUTO: 92 FL (ref 79–97)
MONOCYTES # BLD AUTO: 0.4 X10E3/UL (ref 0.1–0.9)
MONOCYTES NFR BLD AUTO: 6 %
NEUTROPHILS # BLD AUTO: 3.7 X10E3/UL (ref 1.4–7)
NEUTROPHILS NFR BLD AUTO: 55 %
PLATELET # BLD AUTO: 313 X10E3/UL (ref 150–450)
POTASSIUM SERPL-SCNC: 4 MMOL/L (ref 3.5–5.2)
PROT SERPL-MCNC: 7 G/DL (ref 6–8.5)
RBC # BLD AUTO: 4.84 X10E6/UL (ref 3.77–5.28)
SODIUM SERPL-SCNC: 138 MMOL/L (ref 134–144)
TRIGL SERPL-MCNC: 169 MG/DL (ref 0–149)
TSH SERPL DL<=0.005 MIU/L-ACNC: 2.83 UIU/ML (ref 0.45–4.5)
VLDLC SERPL CALC-MCNC: 31 MG/DL (ref 5–40)
WBC # BLD AUTO: 6.5 X10E3/UL (ref 3.4–10.8)

## 2025-07-14 NOTE — PROGRESS NOTES
Chief Complaint  Diabetes and Hyperlipidemia    History of Present Illness  Bren Rivas presents today for follow up on diabetes and hyperlipidemia    Diabetes  Patient does not check blood sugar at home.  Associated symptoms include Neuropathy and Blurred vision.  Per patient current diet is Variety of foods.  Patient does not avoid concentrated sweets.  Last A1c was 10.3 on 7/8/25.    Hyperlipidemia  Patient is not following a low cholesterol diet.   Currently is not on statin therapy.  Patient reports is exercising.    History of Present Illness  The patient is a 45-year-old female who presents for a new diagnosis of diabetes and high cholesterol.    She has a family history of diabetes, with her father and maternal grandfather both having the condition. She experienced gestational diabetes during her pregnancy 19 years ago but has not had elevated blood sugar levels since then. She reports no symptoms of diabetes, although she does mention frequent urination. She has a fondness for sweets and is curious if reducing her sugar intake could help manage her condition. She consumes flavored water with Crystal Light packets, which are sugar-free, and enjoys cookies, ice cream, and cupcakes. During a recent 14-day cruise, she indulged in desserts three times a day and soft drinks, but normally she only consumes soft drinks on weekends. She has lost approximately 20 pounds over the past year through fasting, which she continues to practice. Her current fasting routine involves not eating after 7 PM and resuming meals at 11 AM the next day. She wakes up at 4:45 AM. She is open to checking her blood sugar levels at home, as she did during her gestational diabetes. She was fasting when her labs were drawn, having only consumed sugar-free flavored water.    She is currently on a new migraine preventative medication and has not experienced any migraines since starting this treatment. She reports no side effects from the  daily medication. She is considering taking sumatriptan if she experiences a migraine while on this new medication.    She is not planning on any future pregnancies. She had an ablation and her tubes have been tied.    Diet: Consumes flavored water with Crystal Light packets, enjoys cookies, ice cream, and cupcakes.  Sleep: Wakes up at 4:45 AM, does not eat after 7 PM and resumes meals at 11 AM.    PAST SURGICAL HISTORY:  - Ablation  - Tubal ligation    FAMILY HISTORY  Her father has diabetes. Her maternal grandfather had diabetes. Her mother had high cholesterol.      Patient Care Team:  Dee Segovia MD as PCP - General (Family Medicine)   Current Outpatient Medications on File Prior to Visit   Medication Sig    acetaminophen (TYLENOL) 650 MG 8 hr tablet 1 q am for muscle back pain    Atogepant (Qulipta) 60 MG tablet Take 1 tablet by mouth Daily.    cyclobenzaprine (FLEXERIL) 10 MG tablet Take 1 tablet by mouth 3 (Three) Times a Day As Needed for Muscle Spasms.    SUMAtriptan (IMITREX) 100 MG tablet TAKE 1 TABLET BY MOUTH DAILY. MAY REPEAT IN 2 HOURS IF NEED, MAX 2 IN 24 HOURS    amitriptyline (ELAVIL) 10 MG tablet Take 1 tablet by mouth Every Night. Increase by 10 mg every 3 days as needed for headache prevention to a max of 50 mg. (Patient not taking: Reported on 7/8/2025)    celecoxib (CeleBREX) 200 MG capsule Take 1 capsule by mouth Daily. (Patient not taking: Reported on 5/31/2024)    famotidine (PEPCID) 40 MG tablet TAKE 1 TABLET BY MOUTH EVERY DAY AT NIGHT (Patient not taking: Reported on 5/31/2024)    Scopolamine (Transderm-Scop, 1.5 MG,) 1.5 MG/3DAYS patch Place 1 patch on the skin as directed by provider Every 72 (Seventy-Two) Hours. Starting at least 4 hours prior to travel (Patient not taking: Reported on 5/31/2024)     No current facility-administered medications on file prior to visit.       Objective   Vital Signs:   /94 (BP Location: Right arm, Patient Position: Sitting, Cuff  "Size: Large Adult)   Pulse 102   Temp 98.4 °F (36.9 °C) (Temporal)   Resp 18   Ht 160 cm (63\")   Wt 65.2 kg (143 lb 12.8 oz)   SpO2 99%   BMI 25.47 kg/m²    BP Readings from Last 3 Encounters:   07/15/25 132/94   07/08/25 122/70   05/31/24 126/68     Wt Readings from Last 3 Encounters:   07/15/25 65.2 kg (143 lb 12.8 oz)   07/08/25 65 kg (143 lb 6.4 oz)   05/31/24 65.8 kg (145 lb)         Physical Exam     Physical Exam        Office Visit on 07/15/2025   Component Date Value Ref Range Status    Creatinine, Urine 07/15/2025 42.7  Not Estab. mg/dL Final    Microalbumin, Urine 07/15/2025 9.9  Not Estab. ug/mL Final    Microalbumin/Creatinine Ratio 07/15/2025 23  0 - 29 mg/g creat Final    Comment:                        Normal:                0 -  29                         Moderately increased: 30 - 300                         Severely increased:       >300      Color 07/15/2025 Yellow  Yellow, Straw, Dark Yellow, Lisha Final    Clarity, UA 07/15/2025 Clear  Clear Final    Glucose, UA 07/15/2025 Negative  Negative mg/dL Final    Bilirubin 07/15/2025 Negative  Negative Final    Ketones, UA 07/15/2025 Trace (A)  Negative Final    Specific Gravity  07/15/2025 1.005  1.005 - 1.030 Final    Blood, UA 07/15/2025 1+ (A)  Negative Final    pH, Urine 07/15/2025 5.0  5.0 - 8.0 Final    Protein, POC 07/15/2025 Trace (A)  Negative mg/dL Final    Urobilinogen, UA 07/15/2025 Normal  Normal, 0.2 E.U./dL Final    Leukocytes 07/15/2025 Large (3+) (A)  Negative Final    Nitrite, UA 07/15/2025 Negative  Negative Final    Urine Culture 07/15/2025 Final report   Final    Result 1 07/15/2025 Comment   Final    Comment: Mixed urogenital roselyn  Less than 10,000 colonies/mL      Hemoglobin A1C 07/15/2025 10.0 (A)  4.5 - 5.7 % Final    Lot Number 07/15/2025 #[id number]   Final    Expiration Date 07/15/2025 04/30/2027   Final     A1C Last 3 Results          7/8/2025    14:16 7/15/2025    15:21   HGBA1C Last 3 Results   Hemoglobin A1C 10.3  " 10.0      Lab Results   Component Value Date    CHLPL 224 (H) 07/08/2025    TRIG 169 (H) 07/08/2025    HDL 39 (L) 07/08/2025     (H) 07/08/2025     Lab Results   Component Value Date    TSH 2.830 07/08/2025     Lab Results   Component Value Date    GLUCOSE 188 (H) 07/08/2025    BUN 9 07/08/2025    CREATININE 0.51 (L) 07/08/2025    EGFRIFNONA 116 02/17/2021    EGFRIFAFRI 134 02/17/2021    BCR 18 07/08/2025    K 4.0 07/08/2025    CO2 21 07/08/2025    CALCIUM 9.3 07/08/2025    ALBUMIN 4.6 07/08/2025    AST 28 07/08/2025    ALT 36 (H) 07/08/2025     Lab Results   Component Value Date    WBC 6.5 07/08/2025    HGB 15.0 07/08/2025    HCT 44.5 07/08/2025    MCV 92 07/08/2025     07/08/2025         The 10-year ASCVD risk score (González JONES, et al., 2019) is: 3.6%    Values used to calculate the score:      Age: 45 years      Sex: Female      Is Non- : No      Diabetic: Yes      Tobacco smoker: No      Systolic Blood Pressure: 132 mmHg      Is BP treated: No      HDL Cholesterol: 39 mg/dL      Total Cholesterol: 224 mg/dL    Results  Labs   - A1c: 10.3   - Blood sugar: 188 mg/dL   - Total cholesterol: 224 mg/dL   - LDL: 154 mg/dL             Assessment and Plan    Diagnoses and all orders for this visit:    1. Diabetes mellitus, new onset (Primary)  -     Microalbumin / Creatinine Urine Ratio - Urine, Clean Catch  -     POCT urinalysis dipstick, manual  -     Urine Culture - Urine, Urine, Clean Catch  -     POC Glycosylated Hemoglobin (Hb A1C)  -     glucose blood test strip; Brand of insurance preference to test glucose daily and prn symptoms of high or low sugars. Dx E11.9  Dispense: 100 each; Refill: 3  -     glucose monitor monitoring kit; Use 1 each Daily. Brand of insurance preference to test glucose daily and prn symptoms of high or low sugars. Dx E11.9  Dispense: 1 each; Refill: 0  -     Lancet Device misc; Use 1 Piece Daily. Brand of insurance preference to test glucose daily and  prn symptoms of high or low sugars. Dx E11.9  Dispense: 1 each; Refill: 0  -     Lancets misc; Use 1 Piece Daily. Brand of insurance preference to test glucose daily and prn symptoms of high or low sugars. Dx E11.9  Dispense: 100 each; Refill: 3  -     Hemoglobin A1c; Future  -     Comprehensive Metabolic Panel; Future    2. Hyperglycemia  -     Microalbumin / Creatinine Urine Ratio - Urine, Clean Catch  -     POCT urinalysis dipstick, manual  -     Urine Culture - Urine, Urine, Clean Catch  -     POC Glycosylated Hemoglobin (Hb A1C)    3. Hematuria, unspecified type  -     Urine Culture - Urine, Urine, Clean Catch    4. Mixed hyperlipidemia  -     Lipid Panel; Future        Assessment & Plan  1. Type 2 Diabetes.  - A1c level is significantly elevated at 10.3, indicating an average blood glucose level of approximately 200 or higher.  - Symptoms such as urinary frequency were reported, but no other symptoms like infections, vision changes, or increased thirst were noted.  - Blood glucose level was 188 during the last visit, which was not a fasting measurement. Recent dietary habits, including consuming desserts three times daily during a 14-day cruise, may have contributed to this significantly elevated reading. A1c level may be slightly decreasing due to more balanced eating habits.  Patient is resistant to starting medication at this time asking for a trial of lifestyle modification.  As there is significant room for improvement in diet and activity compared to her habits on a 2-week cruise we will give her a chance to get glucose and control without medication  - A glucometer will be provided for home monitoring of blood glucose levels. If blood glucose levels remain above 200, metformin will be prescribed.  Advised her to contact me in a couple of weeks with the report on her home glucose readings.  Lab tests, including A1c, CMP, and lipid panel, will be repeated in 3 months.    2. Hypercholesterolemia.  - Total  cholesterol level is elevated at 224, with an LDL level of 154.  - Given the diagnosis of diabetes, the 10-year atherosclerotic cardiovascular disease risk is calculated to be 3.6 percent.  - Cholesterol medication will not be initiated at this time. The potential benefits of berberine for cholesterol control were discussed.  - Recommendations include maintaining a balanced diet and regular exercise.    3. Migraine.  - Currently on a new migraine preventative pill and has not experienced any migraines since starting this treatment.  - Reports no side effects from the daily medication.  - Advised that it is safe to take sumatriptan if a migraine occurs while on this new medication.    4. Health maintenance.  - Not planning on any future pregnancies. Had an ablation and tubes have been tied.  - Follow-up in 3 months.      There are no discontinued medications.      Follow Up     Return in about 3 months (around 10/15/2025) for diabetes, cholesterol, with Labs.    Patient was given instructions and counseling regarding her condition or for health maintenance advice. Please see specific information pulled into the AVS if appropriate.     Patient or patient representative verbalized consent for the use of Ambient Listening during the visit with  Dee Segovia MD for chart documentation. 7/21/2025  15:16 EDT

## 2025-07-15 ENCOUNTER — OFFICE VISIT (OUTPATIENT)
Dept: FAMILY MEDICINE CLINIC | Facility: CLINIC | Age: 45
End: 2025-07-15
Payer: COMMERCIAL

## 2025-07-15 VITALS
RESPIRATION RATE: 18 BRPM | DIASTOLIC BLOOD PRESSURE: 94 MMHG | TEMPERATURE: 98.4 F | SYSTOLIC BLOOD PRESSURE: 132 MMHG | OXYGEN SATURATION: 99 % | HEIGHT: 63 IN | HEART RATE: 102 BPM | WEIGHT: 143.8 LBS | BODY MASS INDEX: 25.48 KG/M2

## 2025-07-15 DIAGNOSIS — R73.9 HYPERGLYCEMIA: ICD-10-CM

## 2025-07-15 DIAGNOSIS — R31.9 HEMATURIA, UNSPECIFIED TYPE: ICD-10-CM

## 2025-07-15 DIAGNOSIS — E78.2 MIXED HYPERLIPIDEMIA: ICD-10-CM

## 2025-07-15 DIAGNOSIS — E11.9 DIABETES MELLITUS, NEW ONSET: Primary | ICD-10-CM

## 2025-07-15 LAB
BILIRUB BLD-MCNC: NEGATIVE MG/DL
CLARITY, POC: CLEAR
COLOR UR: YELLOW
EXPIRATION DATE: ABNORMAL
GLUCOSE UR STRIP-MCNC: NEGATIVE MG/DL
HBA1C MFR BLD: 10 % (ref 4.5–5.7)
KETONES UR QL: ABNORMAL
LEUKOCYTE EST, POC: ABNORMAL
Lab: ABNORMAL
NITRITE UR-MCNC: NEGATIVE MG/ML
PH UR: 5 [PH] (ref 5–8)
PROT UR STRIP-MCNC: ABNORMAL MG/DL
RBC # UR STRIP: ABNORMAL /UL
SP GR UR: 1 (ref 1–1.03)
UROBILINOGEN UR QL: NORMAL

## 2025-07-15 PROCEDURE — 99214 OFFICE O/P EST MOD 30 MIN: CPT | Performed by: FAMILY MEDICINE

## 2025-07-15 PROCEDURE — 83036 HEMOGLOBIN GLYCOSYLATED A1C: CPT | Performed by: FAMILY MEDICINE

## 2025-07-15 PROCEDURE — 81002 URINALYSIS NONAUTO W/O SCOPE: CPT | Performed by: FAMILY MEDICINE

## 2025-07-15 RX ORDER — BLOOD-GLUCOSE METER
1 KIT MISCELLANEOUS DAILY
Qty: 1 EACH | Refills: 0 | Status: SHIPPED | OUTPATIENT
Start: 2025-07-15

## 2025-07-15 RX ORDER — AVOBENZONE, HOMOSALATE, OCTISALATE, OCTOCRYLENE 30; 40; 45; 26 MG/ML; MG/ML; MG/ML; MG/ML
1 CREAM TOPICAL DAILY
Qty: 100 EACH | Refills: 3 | Status: SHIPPED | OUTPATIENT
Start: 2025-07-15

## 2025-07-15 RX ORDER — SYRING-NEEDL,DISP,INSUL,0.3 ML 30 GX5/16"
1 SYRINGE, EMPTY DISPOSABLE MISCELLANEOUS DAILY
Qty: 1 EACH | Refills: 0 | Status: SHIPPED | OUTPATIENT
Start: 2025-07-15

## 2025-07-16 LAB
ALBUMIN/CREAT UR: 23 MG/G CREAT (ref 0–29)
CREAT UR-MCNC: 42.7 MG/DL
MICROALBUMIN UR-MCNC: 9.9 UG/ML

## 2025-07-17 LAB
BACTERIA UR CULT: NORMAL
BACTERIA UR CULT: NORMAL